# Patient Record
Sex: FEMALE | Race: WHITE | NOT HISPANIC OR LATINO | Employment: OTHER | URBAN - METROPOLITAN AREA
[De-identification: names, ages, dates, MRNs, and addresses within clinical notes are randomized per-mention and may not be internally consistent; named-entity substitution may affect disease eponyms.]

---

## 2017-08-23 ENCOUNTER — ALLSCRIPTS OFFICE VISIT (OUTPATIENT)
Dept: OTHER | Facility: OTHER | Age: 62
End: 2017-08-23

## 2017-08-23 DIAGNOSIS — R63.5 ABNORMAL WEIGHT GAIN: ICD-10-CM

## 2017-08-23 DIAGNOSIS — E78.2 MIXED HYPERLIPIDEMIA: ICD-10-CM

## 2017-09-16 ENCOUNTER — TRANSCRIBE ORDERS (OUTPATIENT)
Dept: ADMINISTRATIVE | Facility: HOSPITAL | Age: 62
End: 2017-09-16

## 2017-09-16 ENCOUNTER — APPOINTMENT (OUTPATIENT)
Dept: LAB | Facility: HOSPITAL | Age: 62
End: 2017-09-16
Attending: INTERNAL MEDICINE
Payer: COMMERCIAL

## 2017-09-16 DIAGNOSIS — R63.5 ABNORMAL WEIGHT GAIN: ICD-10-CM

## 2017-09-16 DIAGNOSIS — E78.2 MIXED HYPERLIPIDEMIA: ICD-10-CM

## 2017-09-16 DIAGNOSIS — E78.2 MIXED HYPERLIPIDEMIA: Primary | ICD-10-CM

## 2017-09-16 LAB
ALBUMIN SERPL BCP-MCNC: 4 G/DL (ref 3.5–5)
ALP SERPL-CCNC: 60 U/L (ref 46–116)
ALT SERPL W P-5'-P-CCNC: 24 U/L (ref 12–78)
ANION GAP SERPL CALCULATED.3IONS-SCNC: 7 MMOL/L (ref 4–13)
AST SERPL W P-5'-P-CCNC: 17 U/L (ref 5–45)
BASOPHILS # BLD AUTO: 0 THOUSANDS/ΜL (ref 0–0.1)
BASOPHILS NFR BLD AUTO: 0 % (ref 0–1)
BILIRUB SERPL-MCNC: 1.1 MG/DL (ref 0.2–1)
BUN SERPL-MCNC: 12 MG/DL (ref 5–25)
CALCIUM SERPL-MCNC: 9.4 MG/DL (ref 8.3–10.1)
CHLORIDE SERPL-SCNC: 103 MMOL/L (ref 100–108)
CHOLEST SERPL-MCNC: 291 MG/DL (ref 50–200)
CO2 SERPL-SCNC: 29 MMOL/L (ref 21–32)
CREAT SERPL-MCNC: 0.64 MG/DL (ref 0.6–1.3)
EOSINOPHIL # BLD AUTO: 0.1 THOUSAND/ΜL (ref 0–0.61)
EOSINOPHIL NFR BLD AUTO: 1 % (ref 0–6)
ERYTHROCYTE [DISTWIDTH] IN BLOOD BY AUTOMATED COUNT: 13 % (ref 11.6–15.1)
GFR SERPL CREATININE-BSD FRML MDRD: 96 ML/MIN/1.73SQ M
GLUCOSE P FAST SERPL-MCNC: 95 MG/DL (ref 65–99)
HCT VFR BLD AUTO: 40.6 % (ref 37–47)
HDLC SERPL-MCNC: 94 MG/DL (ref 40–60)
HGB BLD-MCNC: 13.6 G/DL (ref 12–16)
LDLC SERPL CALC-MCNC: 175 MG/DL (ref 0–100)
LYMPHOCYTES # BLD AUTO: 1.9 THOUSANDS/ΜL (ref 0.6–4.47)
LYMPHOCYTES NFR BLD AUTO: 34 % (ref 14–44)
MCH RBC QN AUTO: 30.5 PG (ref 27–31)
MCHC RBC AUTO-ENTMCNC: 33.7 G/DL (ref 31.4–37.4)
MCV RBC AUTO: 91 FL (ref 82–98)
MONOCYTES # BLD AUTO: 0.4 THOUSAND/ΜL (ref 0.17–1.22)
MONOCYTES NFR BLD AUTO: 7 % (ref 4–12)
NEUTROPHILS # BLD AUTO: 3.2 THOUSANDS/ΜL (ref 1.85–7.62)
NEUTS SEG NFR BLD AUTO: 57 % (ref 43–75)
NRBC BLD AUTO-RTO: 0 /100 WBCS
PLATELET # BLD AUTO: 279 THOUSANDS/UL (ref 130–400)
PMV BLD AUTO: 7.3 FL (ref 8.9–12.7)
POTASSIUM SERPL-SCNC: 3.6 MMOL/L (ref 3.5–5.3)
PROT SERPL-MCNC: 7.4 G/DL (ref 6.4–8.2)
RBC # BLD AUTO: 4.47 MILLION/UL (ref 4.2–5.4)
SODIUM SERPL-SCNC: 139 MMOL/L (ref 136–145)
TRIGL SERPL-MCNC: 110 MG/DL
TSH SERPL DL<=0.05 MIU/L-ACNC: 1.13 UIU/ML (ref 0.36–3.74)
WBC # BLD AUTO: 5.5 THOUSAND/UL (ref 4.8–10.8)

## 2017-09-16 PROCEDURE — 36415 COLL VENOUS BLD VENIPUNCTURE: CPT

## 2017-09-16 PROCEDURE — 85025 COMPLETE CBC W/AUTO DIFF WBC: CPT

## 2017-09-16 PROCEDURE — 80061 LIPID PANEL: CPT

## 2017-09-16 PROCEDURE — 84443 ASSAY THYROID STIM HORMONE: CPT

## 2017-09-16 PROCEDURE — 80053 COMPREHEN METABOLIC PANEL: CPT

## 2017-09-17 ENCOUNTER — GENERIC CONVERSION - ENCOUNTER (OUTPATIENT)
Dept: OTHER | Facility: OTHER | Age: 62
End: 2017-09-17

## 2017-10-09 ENCOUNTER — TRANSCRIBE ORDERS (OUTPATIENT)
Dept: ADMINISTRATIVE | Facility: HOSPITAL | Age: 62
End: 2017-10-09

## 2017-10-09 DIAGNOSIS — Z12.31 VISIT FOR SCREENING MAMMOGRAM: Primary | ICD-10-CM

## 2017-10-09 DIAGNOSIS — Z13.820 OSTEOPOROSIS SCREENING: ICD-10-CM

## 2017-11-30 ENCOUNTER — HOSPITAL ENCOUNTER (OUTPATIENT)
Dept: RADIOLOGY | Facility: HOSPITAL | Age: 62
Discharge: HOME | End: 2017-11-30
Attending: OBSTETRICS & GYNECOLOGY
Payer: COMMERCIAL

## 2017-11-30 DIAGNOSIS — Z12.31 VISIT FOR SCREENING MAMMOGRAM: ICD-10-CM

## 2017-11-30 DIAGNOSIS — Z13.820 OSTEOPOROSIS SCREENING: ICD-10-CM

## 2017-11-30 PROCEDURE — G0202 SCR MAMMO BI INCL CAD: HCPCS

## 2017-11-30 PROCEDURE — 77080 DXA BONE DENSITY AXIAL: CPT

## 2018-01-11 NOTE — RESULT NOTES
Discussion/Summary   Cholesterol level continues to be very high but other bloodwork looks good  Please make an appointment to discuss possible treatments for high cholesterol  - Dr Sj Rollins     Verified Results  (1) CBC/PLT/DIFF [id number] 07:51AM [facility]     Test Name Result Flag Reference   WBC COUNT 5 50 Thousand/uL  4 80-10 80   RBC COUNT 4 47 Million/uL  4 20-5 40   HEMOGLOBIN 13 6 g/dL  12 0-16 0   HEMATOCRIT 40 6 %  37 0-47 0   MCV 91 fL  82-98   MCH 30 5 pg  27 0-31 0   MCHC 33 7 g/dL  31 4-37 4   RDW 13 0 %  11 6-15 1   MPV 7 3 fL L 8 9-12 7   PLATELET COUNT 192 Thousands/uL  130-400   nRBC AUTOMATED 0 /100 WBCs     NEUTROPHILS RELATIVE PERCENT 57 %  43-75   LYMPHOCYTES RELATIVE PERCENT 34 %  14-44   MONOCYTES RELATIVE PERCENT 7 %  4-12   EOSINOPHILS RELATIVE PERCENT 1 %  0-6   BASOPHILS RELATIVE PERCENT 0 %  0-1   NEUTROPHILS ABSOLUTE COUNT 3 20 Thousands/? ??L  1 85-7 62   LYMPHOCYTES ABSOLUTE COUNT 1 90 Thousands/? ??L  0 60-4 47   MONOCYTES ABSOLUTE COUNT 0 40 Thousand/? ??L  0 17-1 22   EOSINOPHILS ABSOLUTE COUNT 0 10 Thousand/? ??L  0 00-0 61   BASOPHILS ABSOLUTE COUNT 0 00 Thousands/? ??L  0 00-0 10   This is a patient instruction: This test is non-fasting  Please drink two glasses of water morning of bloodwork  (1) LIPID PANEL, FASTING [id number] 07:51AM [facility]     Test Name Result Flag Reference   CHOLESTEROL 291 mg/dL H    HDL,DIRECT 94 mg/dL H 40-60   Specimen collection should occur prior to Metamizole administration due to the potential for falsley depressed results  LDL CHOLESTEROL CALCULATED 175 mg/dL H 0-100   This is a patient instruction:  This is a fasting test  Water, black tea or black coffee only after 9:00pm the night before the test  Drink 2 glasses of water the morning of the test         Triglyceride:        Normal <150 mg/dl   Borderline High 150-199 mg/dl   High 200-499 mg/dl   Very High >499 mg/dl      Cholesterol:       Desirable <200 mg/dl Borderline High 200-239 mg/dl    High >239 mg/dl      HDL Cholesterol:       High>59 mg/dL    Low <41 mg/dL      This screening LDL is a calculated result  It does not have the accuracy of the Direct Measured LDL in the monitoring of patients with hyperlipidemia and/or statin therapy  Direct Measure LDL ([id number]) must be ordered separately in these patients  TRIGLYCERIDES 110 mg/dL  <=150   Specimen collection should occur prior to N-Acetylcysteine or Metamizole administration due to the potential for falsely depressed results  (1) COMPREHENSIVE METABOLIC PANEL [id number] 53:88LM [facility] On license of [facility]     Test Name Result Flag Reference   SODIUM 139 mmol/L  136-145   POTASSIUM 3 6 mmol/L  3 5-5 3   CHLORIDE 103 mmol/L  100-108   CARBON DIOXIDE 29 mmol/L  21-32   ANION GAP (CALC) 7 mmol/L  4-13   BLOOD UREA NITROGEN 12 mg/dL  5-25   CREATININE 0 64 mg/dL  0 60-1 30   Standardized to IDMS reference method   CALCIUM 9 4 mg/dL  8 3-10 1   BILI, TOTAL 1 10 mg/dL H 0 20-1 00   ALK PHOSPHATAS 60 U/L     ALT (SGPT) 24 U/L  12-78   Specimen collection should occur prior to Sulfasalazine administration due to the potential for falsely depressed results  AST(SGOT) 17 U/L  5-45   Specimen collection should occur prior to Sulfasalazine administration due to the potential for falsely depressed results  ALBUMIN 4 0 g/dL  3 5-5 0   TOTAL PROTEIN 7 4 g/dL  6 4-8 2   eGFR 96 ml/min/1 73sq m     [facility] Disease Education Program recommendations are as follows:  GFR calculation is accurate only with a steady state creatinine  Chronic Kidney disease less than 60 ml/min/1 73 sq  meters  Kidney failure less than 15 ml/min/1 73 sq  meters  GLUCOSE FASTING 95 mg/dL  65-99   Specimen collection should occur prior to Sulfasalazine administration due to the potential for falsely depressed results  Specimen collection should occur prior to Sulfapyridine administration due to the potential for falsely elevated results  (1) TSH WITH FT4 REFLEX [id number] 07:51AM [facility]     Test Name Result Flag Reference   TSH 1 128 uIU/mL  0 358-3 740   Patients undergoing fluorescein dye angiography may retain small amounts of fluorescein in the body for 48-72 hours post procedure  Samples containing fluorescein can produce falsely depressed TSH values  If the patient had this procedure,a specimen should be resubmitted post fluorescein clearance            The recommended reference ranges for TSH during pregnancy are as follows:  First trimester 0 1 to 2 5 uIU/mL  Second trimester  0 2 to 3 0 uIU/mL  Third trimester 0 3 to 3 0 uIU/m

## 2018-01-12 NOTE — PROGRESS NOTES
Assessment    1  Encounter for preventive health examination (V70 0) (Z00 00)   2  Adult BMI 27 0-27 9 kg/sq m (V85 23) (Z68 27)   3  Weight gain (783 1) (R63 5)    Discussion/Summary  health maintenance visit Currently, she eats a healthy diet and has an inadequate exercise regimen  the risks and benefits of cervical cancer screening were discussed cervical cancer screening is current cervical cancer screening is managed by gyn Breast cancer screening: the risks and benefits of breast cancer screening were discussed, monthly self breast exam was advised and mammogram is current  Colorectal cancer screening: the patient declines colorectal cancer screening  Osteoporosis screening: bone mineral density testing is current  Screening lab work includes hemoglobin, glucose, lipid profile and thyroid function testing  The patient declines immunizations  Advice and education were given regarding nutrition, aerobic exercise, weight loss, calcium supplements, vitamin D supplements, alcohol use, sunscreen use and seat belt use  Chief Complaint  cpe      History of Present Illness  HM, Adult Female: The patient is being seen for a health maintenance evaluation  The last health maintenance visit was 3 year(s) ago  General Health: The patient's health since the last visit is described as good  She has regular dental visits  She denies vision problems  She denies hearing loss  Immunizations status: not up to date  Lifestyle:  She consumes a diverse and healthy diet  She has weight concerns  She does not exercise regularly  She does not use tobacco  She denies alcohol use  She denies drug use  Reproductive health: the patient is postmenopausal    Screening: cancer screening reviewed and updated  metabolic screening reviewed and updated  risk screening reviewed and updated  HPI: Here for cpe  Having some isssues with weight gain  She cannot think of a reason, but does have a family history of hypothyroidism  Review of Systems    Constitutional: recent weight gain  Eyes: No complaints of eye pain, no red eyes, no eyesight problems, no discharge, no dry eyes, no itching of eyes  ENT: no complaints of earache, no loss of hearing, no nose bleeds, no nasal discharge, no sore throat, no hoarseness  Cardiovascular: No complaints of slow heart rate, no fast heart rate, no chest pain, no palpitations, no leg claudication, no lower extremity edema  Respiratory: No complaints of shortness of breath, no wheezing, no cough, no SOB on exertion, no orthopnea, no PND  Gastrointestinal: No complaints of abdominal pain, no constipation, no nausea or vomiting, no diarrhea, no bloody stools  Genitourinary: No complaints of dysuria, no incontinence, no pelvic pain, no dysmenorrhea, no vaginal discharge or bleeding  Musculoskeletal: No complaints of arthralgias, no myalgias, no joint swelling or stiffness, no limb pain or swelling  Integumentary: No complaints of skin rash or lesions, no itching, no skin wounds, no breast pain or lump  Neurological: No complaints of headache, no confusion, no convulsions, no numbness, no dizziness or fainting, no tingling, no limb weakness, no difficulty walking  Psychiatric: Not suicidal, no sleep disturbance, no anxiety or depression, no change in personality, no emotional problems  Endocrine: No complaints of proptosis, no hot flashes, no muscle weakness, no deepening of the voice, no feelings of weakness  Hematologic/Lymphatic: No complaints of swollen glands, no swollen glands in the neck, does not bleed easily, does not bruise easily  Active Problems    1  Anxiety disorder (300 00) (F41 9)   2  Colon cancer screening (V76 51) (Z12 11)   3  Dysfunction of left eustachian tube (381 81) (W49 82)   4  Encounter for screening colonoscopy (V76 51) (Z12 11)   5  Encounter for screening for osteoporosis (V82 81) (Z13 820)   6   Encounter for screening mammogram for breast cancer (V76 12) (Z12 31)   7  Exophthalmos (376 30) (H05 20)   8  Herpangina (074 0) (B08 5)   9  Lumbago (724 2) (M54 5)   10  Mixed hyperlipidemia (272 2) (E78 2)   11  Otalgia, unspecified laterality (388 70) (H92 09)    Past Medical History    · History of Acute maxillary sinusitis, recurrence not specified (461 0) (J01 00)   · Acute sinusitis (461 9) (J01 90)    Family History  Mother    · No pertinent family history  Father    · No pertinent family history    Social History    · Never A Smoker    Current Meds   1  ALPRAZolam 0 25 MG Oral Tablet; 1 tid prn; Therapy: [id number] to (Last Rx:31Oct2016)  Requested for: 31Oct2016 Ordered   2  Citracal/Vitamin D TABS; TAKE 2 TABLETS ONCE DAILY Recorded   3  Daily Vitamin Oral Tablet; Take 1 tablet daily Recorded   4  Glucosamine Chondr 1500 Complx Oral Capsule; TAKE 1 CAPSULE Daily Recorded    Allergies    1  No Known Drug Allergies    Vitals   Recorded: [id number] 02:39PM   Temperature 96 9 F   Heart Rate 80   Respiration 16   Systolic 562   Diastolic 72   Height 5 ft 3 in   Weight 158 lb    BMI Calculated 27 99   BSA Calculated 1 75     Physical Exam    Constitutional   General appearance: No acute distress, well appearing and well nourished  Eyes   Conjunctiva and lids: No swelling, erythema or discharge  Pupils and irises: Equal, round, reactive to light  Ears, Nose, Mouth, and Throat   External inspection of ears and nose: Normal     Otoscopic examination: Tympanic membranes translucent with normal light reflex  Canals patent without erythema  Hearing: Normal     Nasal mucosa, septum, and turbinates: Normal without edema or erythema  Lips, teeth, and gums: Normal, good dentition  Oropharynx: Normal with no erythema, edema, exudate or lesions  Neck   Neck: Supple, symmetric, trachea midline, no masses  Thyroid: Normal, no thyromegaly  Pulmonary   Respiratory effort: No increased work of breathing or signs of respiratory distress      Percussion of chest: Normal     Palpation of chest: Normal     Auscultation of lungs: Clear to auscultation  Cardiovascular   Palpation of heart: Normal PMI, no thrills  Auscultation of heart: Normal rate and rhythm, normal S1 and S2, no murmurs  Carotid pulses: 2+ bilaterally  Abdominal aorta: Normal     Femoral pulses: 2+ bilaterally  Pedal pulses: 2+ bilaterally  Examination of extremities for edema and/or varicosities: Normal     Abdomen   Abdomen: Non-tender, no masses  Liver and spleen: No hepatomegaly or splenomegaly  Examination for hernias: No hernia appreciated  Lymphatic   Palpation of lymph nodes in neck: No lymphadenopathy  Palpation of lymph nodes in axillae: No lymphadenopathy  Palpation of lymph nodes in groin: No lymphadenopathy  Palpation of lymph nodes in other areas: No lymphadenopathy  Musculoskeletal   Gait and station: Normal     Digits and nails: Normal without clubbing or cyanosis  Joints, bones, and muscles: Normal     Range of motion: Normal     Stability: Normal     Muscle strength/tone: Normal     Skin   Skin and subcutaneous tissue: Normal without rashes or lesions  Palpation of skin and subcutaneous tissue: Normal turgor  Neurologic   Cranial nerves: Cranial nerves II-XII intact  Reflexes: 2+ and symmetric  Sensation: No sensory loss  Psychiatric   Judgment and insight: Normal     Orientation to person, place, and time: Normal     Recent and remote memory: Intact  Mood and affect: Normal        Procedure    Procedure: Indication: routine screening     Results: 20/20 in both eyes with corrective device, 20/20 in the right eye with corrective device, 20/20 in the left eye with corrective device   Color vision was and the results were normal       Signatures   Electronically signed by : SID Hartley ; Aug 23 2017  3:15PM EST                       (Author)

## 2018-01-13 VITALS
RESPIRATION RATE: 16 BRPM | SYSTOLIC BLOOD PRESSURE: 124 MMHG | BODY MASS INDEX: 28 KG/M2 | TEMPERATURE: 96.9 F | DIASTOLIC BLOOD PRESSURE: 72 MMHG | HEART RATE: 80 BPM | HEIGHT: 63 IN | WEIGHT: 158 LBS

## 2018-10-10 ENCOUNTER — TRANSCRIBE ORDERS (OUTPATIENT)
Dept: ADMINISTRATIVE | Facility: HOSPITAL | Age: 63
End: 2018-10-10

## 2018-10-10 DIAGNOSIS — Z12.39 SCREENING BREAST EXAMINATION: Primary | ICD-10-CM

## 2018-12-04 ENCOUNTER — HOSPITAL ENCOUNTER (OUTPATIENT)
Dept: RADIOLOGY | Facility: HOSPITAL | Age: 63
Discharge: HOME | End: 2018-12-04
Attending: OBSTETRICS & GYNECOLOGY
Payer: COMMERCIAL

## 2018-12-04 VITALS — BODY MASS INDEX: 26.22 KG/M2 | WEIGHT: 148 LBS | HEIGHT: 63 IN

## 2018-12-04 DIAGNOSIS — Z12.39 SCREENING BREAST EXAMINATION: ICD-10-CM

## 2018-12-04 PROCEDURE — 77067 SCR MAMMO BI INCL CAD: CPT

## 2018-12-28 ENCOUNTER — TELEPHONE (OUTPATIENT)
Dept: FAMILY MEDICINE CLINIC | Facility: CLINIC | Age: 63
End: 2018-12-28

## 2018-12-28 NOTE — TELEPHONE ENCOUNTER
Kirill javier has extreme anxiety and got a jury duty letter for March  She does not drive and jury duty would be in [facility]  Is Dr Meghna Irvin willing to write a note excusing her?     Please let patient know     Thank you

## 2018-12-31 NOTE — TELEPHONE ENCOUNTER
I have to certify that she has a severe medical condition to get her out of jury duty and I unfortunately can't do that

## 2019-06-03 ENCOUNTER — OFFICE VISIT (OUTPATIENT)
Dept: FAMILY MEDICINE CLINIC | Facility: CLINIC | Age: 64
End: 2019-06-03
Payer: COMMERCIAL

## 2019-06-03 VITALS
WEIGHT: 149 LBS | SYSTOLIC BLOOD PRESSURE: 110 MMHG | HEART RATE: 66 BPM | RESPIRATION RATE: 18 BRPM | HEIGHT: 63 IN | DIASTOLIC BLOOD PRESSURE: 68 MMHG | BODY MASS INDEX: 26.4 KG/M2 | TEMPERATURE: 97.8 F

## 2019-06-03 DIAGNOSIS — R09.81 SINUS CONGESTION: Primary | ICD-10-CM

## 2019-06-03 PROCEDURE — 99213 OFFICE O/P EST LOW 20 MIN: CPT | Performed by: NURSE PRACTITIONER

## 2019-06-03 RX ORDER — METHYLPREDNISOLONE 4 MG/1
TABLET ORAL
Qty: 21 TABLET | Refills: 0 | Status: SHIPPED | OUTPATIENT
Start: 2019-06-03 | End: 2019-07-29

## 2019-07-29 ENCOUNTER — OFFICE VISIT (OUTPATIENT)
Dept: FAMILY MEDICINE CLINIC | Facility: CLINIC | Age: 64
End: 2019-07-29
Payer: COMMERCIAL

## 2019-07-29 VITALS
BODY MASS INDEX: 26.79 KG/M2 | DIASTOLIC BLOOD PRESSURE: 78 MMHG | RESPIRATION RATE: 18 BRPM | TEMPERATURE: 98.8 F | HEART RATE: 68 BPM | HEIGHT: 63 IN | SYSTOLIC BLOOD PRESSURE: 124 MMHG | WEIGHT: 151.2 LBS

## 2019-07-29 DIAGNOSIS — B02.9 HERPES ZOSTER WITHOUT COMPLICATION: Primary | ICD-10-CM

## 2019-07-29 PROCEDURE — 99213 OFFICE O/P EST LOW 20 MIN: CPT | Performed by: NURSE PRACTITIONER

## 2019-07-29 PROCEDURE — 3008F BODY MASS INDEX DOCD: CPT | Performed by: NURSE PRACTITIONER

## 2019-07-29 RX ORDER — VALACYCLOVIR HYDROCHLORIDE 1 G/1
1000 TABLET, FILM COATED ORAL 3 TIMES DAILY
Qty: 21 TABLET | Refills: 0 | Status: SHIPPED | OUTPATIENT
Start: 2019-07-29 | End: 2019-09-16 | Stop reason: ALTCHOICE

## 2019-07-29 NOTE — PROGRESS NOTES
Assessment/Plan:    Take Valacyclovir as directed  Reviewed supportive care  Advised rash is contagious until lesions crust over  RTO prn  Declined CRC screening    Problem List Items Addressed This Visit     None      Visit Diagnoses     Herpes zoster without complication    -  Primary    Relevant Medications    valACYclovir (VALTREX) 1,000 mg tablet                Patient Instructions     Capsaicin cream over the counter   May continue with Tylenol as needed    Shingles   WHAT YOU NEED TO KNOW:   What is shingles? Shingles is a painful rash  Shingles is caused by the same virus that causes chickenpox (varicella-zoster)  After you get chickenpox, the virus stays in your body for several years without causing any symptoms  Shingles occurs when the virus becomes active again  Once active, the virus will travel along a nerve to your skin and cause a rash  What are the signs and symptoms of shingles? Shingles often starts with pain in the back, chest, neck, or face  A rash then develops in the same area  The rash is usually found on only one side of the body  The rash may feel itchy or painful  It starts as red dots that become blisters filled with fluid  The blisters usually grow bigger, become filled with pus, and then crust over after a few days  You may also have any of the following:  · Fatigue and muscle weakness    · Pain when your skin is lightly touched    · Headache    · Fever    · Eye pain when exposed to light  What increases my risk for shingles? · Age older than 48    · Exposure to the virus while your mother was pregnant with you    · A medical condition such as cancer, AIDS, or Hodgkin disease     · Treatment for cancer that decreases your immune system    · Stressful life events that weaken your immune system    · An organ or stem cell transplant  How is shingles diagnosed? Your healthcare provider will ask about your symptoms  Tell him if you have had chickenpox   Tell him if you have recently been around anyone who has chickenpox or shingles  The appearance of your rash will usually be enough for your healthcare provider to know you have shingles  He may also send skin scrapings or fluid from your blisters for tests  How is shingles treated? · Antiviral medicine  helps decrease symptoms and healing time  It may also decrease your risk for nerve pain  You will need to start taking this medicine within 3 days of the start of symptoms to prevent nerve pain  · Pain medicine  may be prescribed or suggested by your healthcare provider  You may need NSAIDs, acetaminophen, or opioid medicine depending on how much pain you are in  · Topical anesthetics  are used to numb the skin and decrease pain  They can be a cream, gel, spray, or patch  · Anticonvulsants  decrease nerve pain and may help you sleep at night  · Antidepressants  may be used to decrease nerve pain  · Epidural medicine  is put into your spine to block pain  This medicine treats severe pain that does not get better with other pain medicines  Epidural medicine includes numbing medicine and steroids  Can I infect others? The virus can be passed to a person who has never had chickenpox  This person may get chickenpox, but not shingles  You may pass the virus to others as long as you have a rash  The virus is spread by direct contact with the fluid from the blisters  Usually, you cannot spread the virus once the blisters dry up  What are the risks of shingles? If left untreated, shingles may cause eye problems, such as a drooping eyelid or blindness  It may lead to a brain infection or stroke  Shingles can also cause nerve damage and lead to twitching, dizziness, or loss of taste and hearing  The blisters may leave scars or changes in skin color  Shingles may cause pain even after the rash is gone  It may also lead to trouble moving parts of your body  How can I care for myself? Keep your rash clean and dry  Cover your rash with a bandage or clothing  Do not use bandages with adhesive  These may irritate your skin and make your rash last longer  What can I do to help prevent shingles or a shingles outbreak? A vaccine may be given to help prevent shingles  Ask for more information about this vaccine  Where can I find more information about shingles? · Centers for Disease Control and Prevention  [address] , [address]  Phone: 1- 323 - 0724702  Phone: 3- 077 - 5011407  Web Address: [employer] com   When should I seek immediate care? · You have painful, red, warm skin around the blisters, or the blisters drain pus  · Your neck is stiff or you have trouble moving it  · You have trouble moving your arms, legs, or face  · You have a seizure  · You have weakness in an arm or leg  · You become confused, or have difficulty speaking  · You have dizziness, a severe headache, or hearing or vision loss  When should I contact my healthcare provider? · You feel weak or have a headache  · You have a cough, chills, or a fever  · You have abdominal pain or nausea, or you are vomiting  · Your rash becomes more itchy or painful  · Your rash spreads to other parts of your body  · Your pain worsens and does not go away even after you take medicine  · You have questions or concerns about your condition or care  CARE AGREEMENT:   You have the right to help plan your care  Learn about your health condition and how it may be treated  Discuss treatment options with your caregivers to decide what care you want to receive  You always have the right to refuse treatment  The above information is an  only  It is not intended as medical advice for individual conditions or treatments  Talk to your doctor, nurse or pharmacist before following any medical regimen to see if it is safe and effective for you    © 2017 [employer] Information is for End User's use only and may not be sold, redistributed or otherwise used for commercial purposes  All illustrations and images included in CareNotes® are the copyrighted property of A D A M , Inc  or Mack Pinedo if symptoms worsen or fail to improve  Subjective:      Patient ID: Skyler Ferro is a 59 y o  female  Chief Complaint   Patient presents with    Rash     painful on abdomen  [facility]       Here today with complaints of a painful rash on her abdomen that started about 4 days ago  States it initially felt itchy, but she didn't think much of it, as she had family visiting  The pain has been keeping her up at night  She has been taking Tylenol, which dulls the pain  Reports having had shingles on the other side of her abdomen while in her 25s  The following portions of the patient's history were reviewed and updated as appropriate: allergies, current medications, past family history, past medical history, past social history, past surgical history and problem list     Review of Systems   Constitutional: Negative  Skin: Positive for rash  Current Outpatient Medications   Medication Sig Dispense Refill    valACYclovir (VALTREX) 1,000 mg tablet Take 1 tablet (1,000 mg total) by mouth 3 (three) times a day for 7 days 21 tablet 0     No current facility-administered medications for this visit  Objective:    /78   Pulse 68   Temp 98 8 °F (37 1 °C)   Resp 18   Ht 5' 3" (1 6 m)   Wt 68 6 kg (151 lb 3 2 oz)   BMI 26 78 kg/m²        Physical Exam   Constitutional: She appears well-developed and well-nourished  Cardiovascular: Normal rate, regular rhythm and normal heart sounds  No murmur heard  Pulmonary/Chest: Effort normal and breath sounds normal    Neurological: She is alert  Skin: Skin is warm and dry  Psychiatric: She has a normal mood and affect  Nursing note and vitals reviewed               Soham Garrison

## 2019-07-29 NOTE — PATIENT INSTRUCTIONS
Capsaicin cream over the counter   May continue with Tylenol as needed    Shingles   WHAT YOU NEED TO KNOW:   What is shingles? Shingles is a painful rash  Shingles is caused by the same virus that causes chickenpox (varicella-zoster)  After you get chickenpox, the virus stays in your body for several years without causing any symptoms  Shingles occurs when the virus becomes active again  Once active, the virus will travel along a nerve to your skin and cause a rash  What are the signs and symptoms of shingles? Shingles often starts with pain in the back, chest, neck, or face  A rash then develops in the same area  The rash is usually found on only one side of the body  The rash may feel itchy or painful  It starts as red dots that become blisters filled with fluid  The blisters usually grow bigger, become filled with pus, and then crust over after a few days  You may also have any of the following:  · Fatigue and muscle weakness    · Pain when your skin is lightly touched    · Headache    · Fever    · Eye pain when exposed to light  What increases my risk for shingles? · Age older than 48    · Exposure to the virus while your mother was pregnant with you    · A medical condition such as cancer, AIDS, or Hodgkin disease     · Treatment for cancer that decreases your immune system    · Stressful life events that weaken your immune system    · An organ or stem cell transplant  How is shingles diagnosed? Your healthcare provider will ask about your symptoms  Tell him if you have had chickenpox  Tell him if you have recently been around anyone who has chickenpox or shingles  The appearance of your rash will usually be enough for your healthcare provider to know you have shingles  He may also send skin scrapings or fluid from your blisters for tests  How is shingles treated? · Antiviral medicine  helps decrease symptoms and healing time  It may also decrease your risk for nerve pain   You will need to start taking this medicine within 3 days of the start of symptoms to prevent nerve pain  · Pain medicine  may be prescribed or suggested by your healthcare provider  You may need NSAIDs, acetaminophen, or opioid medicine depending on how much pain you are in  · Topical anesthetics  are used to numb the skin and decrease pain  They can be a cream, gel, spray, or patch  · Anticonvulsants  decrease nerve pain and may help you sleep at night  · Antidepressants  may be used to decrease nerve pain  · Epidural medicine  is put into your spine to block pain  This medicine treats severe pain that does not get better with other pain medicines  Epidural medicine includes numbing medicine and steroids  Can I infect others? The virus can be passed to a person who has never had chickenpox  This person may get chickenpox, but not shingles  You may pass the virus to others as long as you have a rash  The virus is spread by direct contact with the fluid from the blisters  Usually, you cannot spread the virus once the blisters dry up  What are the risks of shingles? If left untreated, shingles may cause eye problems, such as a drooping eyelid or blindness  It may lead to a brain infection or stroke  Shingles can also cause nerve damage and lead to twitching, dizziness, or loss of taste and hearing  The blisters may leave scars or changes in skin color  Shingles may cause pain even after the rash is gone  It may also lead to trouble moving parts of your body  How can I care for myself? Keep your rash clean and dry  Cover your rash with a bandage or clothing  Do not use bandages with adhesive  These may irritate your skin and make your rash last longer  What can I do to help prevent shingles or a shingles outbreak? A vaccine may be given to help prevent shingles  Ask for more information about this vaccine  Where can I find more information about shingles?    · Centers for Disease Control and Prevention  [address] [place] [address] [address] [place] , [address]  Phone: 4- 540 - [phone]  Phone: 6- 331 - [phone]  Web Address: [employer] com   When should I seek immediate care? · You have painful, red, warm skin around the blisters, or the blisters drain pus  · Your neck is stiff or you have trouble moving it  · You have trouble moving your arms, legs, or face  · You have a seizure  · You have weakness in an arm or leg  · You become confused, or have difficulty speaking  · You have dizziness, a severe headache, or hearing or vision loss  When should I contact my healthcare provider? · You feel weak or have a headache  · You have a cough, chills, or a fever  · You have abdominal pain or nausea, or you are vomiting  · Your rash becomes more itchy or painful  · Your rash spreads to other parts of your body  · Your pain worsens and does not go away even after you take medicine  · You have questions or concerns about your condition or care  CARE AGREEMENT:   You have the right to help plan your care  Learn about your health condition and how it may be treated  Discuss treatment options with your caregivers to decide what care you want to receive  You always have the right to refuse treatment  The above information is an  only  It is not intended as medical advice for individual conditions or treatments  Talk to your doctor, nurse or pharmacist before following any medical regimen to see if it is safe and effective for you  © 2017 [employer] Information is for End User's use only and may not be sold, redistributed or otherwise used for commercial purposes  All illustrations and images included in CareNotes® are the copyrighted property of A D A M , Inc  or [employer] Alva

## 2019-08-06 ENCOUNTER — HOSPITAL ENCOUNTER (EMERGENCY)
Facility: HOSPITAL | Age: 64
Discharge: HOME | End: 2019-08-06
Attending: EMERGENCY MEDICINE
Payer: COMMERCIAL

## 2019-08-06 ENCOUNTER — TELEPHONE (OUTPATIENT)
Dept: FAMILY MEDICINE CLINIC | Facility: CLINIC | Age: 64
End: 2019-08-06

## 2019-08-06 VITALS
BODY MASS INDEX: 26.57 KG/M2 | DIASTOLIC BLOOD PRESSURE: 94 MMHG | WEIGHT: 150 LBS | TEMPERATURE: 99.2 F | SYSTOLIC BLOOD PRESSURE: 181 MMHG | RESPIRATION RATE: 18 BRPM | HEART RATE: 91 BPM | OXYGEN SATURATION: 99 %

## 2019-08-06 DIAGNOSIS — B02.29 POST HERPETIC NEURALGIA: Primary | ICD-10-CM

## 2019-08-06 DIAGNOSIS — B02.9 HERPES ZOSTER WITHOUT COMPLICATION: Primary | ICD-10-CM

## 2019-08-06 PROCEDURE — 99282 EMERGENCY DEPT VISIT SF MDM: CPT

## 2019-08-06 PROCEDURE — 96372 THER/PROPH/DIAG INJ SC/IM: CPT

## 2019-08-06 RX ORDER — GABAPENTIN 300 MG/1
CAPSULE ORAL
Qty: 60 CAPSULE | Refills: 0 | Status: SHIPPED | OUTPATIENT
Start: 2019-08-06 | End: 2019-08-31 | Stop reason: SDUPTHER

## 2019-08-06 RX ORDER — GABAPENTIN 300 MG/1
300 CAPSULE ORAL 2 TIMES DAILY
Qty: 17 CAPSULE | Refills: 0 | Status: SHIPPED | OUTPATIENT
Start: 2019-08-06 | End: 2019-09-16 | Stop reason: ALTCHOICE

## 2019-08-06 RX ORDER — GABAPENTIN 300 MG/1
300 CAPSULE ORAL ONCE
Status: COMPLETED | OUTPATIENT
Start: 2019-08-06 | End: 2019-08-06

## 2019-08-06 RX ORDER — KETOROLAC TROMETHAMINE 30 MG/ML
15 INJECTION, SOLUTION INTRAMUSCULAR; INTRAVENOUS ONCE
Status: DISCONTINUED | OUTPATIENT
Start: 2019-08-06 | End: 2019-08-06

## 2019-08-06 RX ORDER — KETOROLAC TROMETHAMINE 30 MG/ML
15 INJECTION, SOLUTION INTRAMUSCULAR; INTRAVENOUS ONCE
Status: COMPLETED | OUTPATIENT
Start: 2019-08-06 | End: 2019-08-06

## 2019-08-06 RX ADMIN — KETOROLAC TROMETHAMINE 15 MG: 30 INJECTION, SOLUTION INTRAMUSCULAR at 16:50

## 2019-08-06 RX ADMIN — GABAPENTIN 300 MG: 300 CAPSULE ORAL at 16:49

## 2019-08-06 NOTE — TELEPHONE ENCOUNTER
Patient seen last week and diagnosed with shingles  She is finished with the Anti Viral 7 day medication and stated "the worst of the pain is gone" but still very uncomfortable  Is there anything else she can be prescribed? Another round of antiviral medication?     Please call the patient and advise

## 2019-08-06 NOTE — ED PROVIDER NOTES
History  Chief Complaint   Patient presents with    Rash     States she was dx with shingles and completed Valtrex  Rash right side of trunk  States she was not given pain med and she cannot sleep  Tearful  States she has no energy and is very weak     60 y/o female presenting for evaluation of a rash to the right portion of her chest and side over the past 2 weeks  Patient states that she just got over treatment for shingles finishing a 7 day course of Valtrex  Relays that she called her doctor this morning requesting something else for pain  States that she has a hard time tolerating naproxen or ibuprofen due to GI side effects  Has been taking Tylenol with minimal relief  States that she has been feeling generally fatigued and has had poor sleep  States that she has insomnia on a regular basis however due to the burning radiating pain this has worsened  Overall the rash has calmed down and pain has decreased however is frustrated as the pain continues  Patient had burning like pain before the rash began for about 3 days, had symptoms for 3 days prior to starting valtrex  Denies fevers, nausea vomiting, chest pain, abdominal pain, shortness of breath, headache, fevers  Prior to Admission Medications   Prescriptions Last Dose Informant Patient Reported? Taking?   gabapentin (NEURONTIN) 300 mg capsule   No No   Si cap PO daily on day 1, then 1 cap PO BID   valACYclovir (VALTREX) 1,000 mg tablet  Self No No   Sig: Take 1 tablet (1,000 mg total) by mouth 3 (three) times a day for 7 days   Patient taking differently: Take 1,000 mg by mouth 3 (three) times a day Patient completed      Facility-Administered Medications: None       History reviewed  No pertinent past medical history      Past Surgical History:   Procedure Laterality Date    HYSTERECTOMY      TONSILLECTOMY         Family History   Problem Relation Age of Onset    Breast cancer Maternal Grandmother      I have reviewed and agree with the history as documented  Social History     Tobacco Use    Smoking status: Never Smoker    Smokeless tobacco: Never Used   Substance Use Topics    Alcohol use: Not Currently    Drug use: Never        Review of Systems   Constitutional: Positive for fatigue  Negative for activity change, appetite change, chills, diaphoresis, fever and unexpected weight change  HENT: Negative  Eyes: Negative  Respiratory: Negative  Cardiovascular: Negative  Gastrointestinal: Negative  Genitourinary: Negative  Musculoskeletal: Negative  Skin: Positive for rash  Negative for color change, pallor and wound  Neurological: Positive for weakness  Negative for dizziness, tremors, seizures, syncope, facial asymmetry, speech difficulty, light-headedness, numbness and headaches  All other systems reviewed and are negative  Physical Exam  Physical Exam   Constitutional: She is oriented to person, place, and time  She appears well-developed and well-nourished  HENT:   Head: Normocephalic and atraumatic  Nose: Nose normal    Cardiovascular: Normal rate, regular rhythm, normal heart sounds and intact distal pulses  Exam reveals no gallop and no friction rub  No murmur heard  Pulmonary/Chest: Effort normal and breath sounds normal  No stridor  No respiratory distress  She has no wheezes  She has no rales  She exhibits no tenderness  spo2 is 99% indicating adequate oxygenation    Abdominal: Soft  Bowel sounds are normal  She exhibits no distension and no mass  There is no tenderness  There is no rebound and no guarding  No hernia  Neurological: She is alert and oriented to person, place, and time  Skin: Skin is warm and dry  Capillary refill takes less than 2 seconds  Rash noted  Nursing note and vitals reviewed        Vital Signs  ED Triage Vitals [08/06/19 1555]   Temperature Pulse Respirations Blood Pressure SpO2   99 2 °F (37 3 °C) 91 18 (!) 181/94 99 %      Temp Source Heart Rate Source Patient Position - Orthostatic VS BP Location FiO2 (%)   Oral Monitor Lying Right arm --      Pain Score       9           Vitals:    08/06/19 1555   BP: (!) 181/94   Pulse: 91   Patient Position - Orthostatic VS: Lying         Visual Acuity      ED Medications  Medications   gabapentin (NEURONTIN) capsule 300 mg (300 mg Oral Given 8/6/19 1649)   ketorolac (TORADOL) injection 15 mg (15 mg Intramuscular Given 8/6/19 1650)       Diagnostic Studies  Results Reviewed     None                 No orders to display              Procedures  Procedures       ED Course                               MDM  Number of Diagnoses or Management Options  Post herpetic neuralgia:   Diagnosis management comments: Patient persists with small area herpetic like rash, will treat for post herpetic neuralgia  Offered labs and IV fluids however agreeable to IM toradol and gabapentin, will need further refills from PCP however will get patient started on this  She is not on any narcotics  Patient is informed to return to the emergency department for worsening of symptoms and was given proper education regarding their diagnosis and symptoms  Otherwise the patient is informed to follow up with their primary care doctor for re-evaluation  The patient verbalizes understanding and agrees with above assessment and plan  All questions were answered  Please Note: Fluency Direct voice recognition software may have been used in the creation of this document  Wrong words or sound a like substitutions may have occurred due to the inherent limitations of the voice software             Amount and/or Complexity of Data Reviewed  Review and summarize past medical records: yes  Independent visualization of images, tracings, or specimens: yes        Disposition  Final diagnoses:   Post herpetic neuralgia     Time reflects when diagnosis was documented in both MDM as applicable and the Disposition within this note     Time User Action Codes Description Comment    8/6/2019  4:56 PM Fernando Jackson Add [B02 29] Post herpetic neuralgia       ED Disposition     ED Disposition Condition Date/Time Comment    Discharge Stable Tue Aug 6, 2019  4:56 PM Sonia Valles discharge to home/self care  Follow-up Information     Follow up With Specialties Details Why Contact Info Additional [address] Emergency Department Emergency Medicine Go to  If symptoms worsen [address] [facility]  [phone] [facility], [place], [place], [address] [facility], [place] Internal Medicine, Family Medicine Schedule an appointment as soon as possible for a visit in 1 week  [address]  [address] [facility]  [phone]             Patient's Medications   Discharge Prescriptions    GABAPENTIN (NEURONTIN) 300 MG CAPSULE    1 cap PO daily on day 1, then 1 cap PO BID       Start Date: 8/6/2019  End Date: --       Order Dose: --       Quantity: 60 capsule    Refills: 0    GABAPENTIN (NEURONTIN) 300 MG CAPSULE    Take 1 capsule (300 mg total) by mouth 2 (two) times a day for 6 days For post-herpetic neuralgia:  take 2 tablets on day 2, Then take 3 tablets on day 3 and every day after that as instructed by your doctor  Start Date: 8/6/2019  End Date: 8/12/2019       Order Dose: 300 mg       Quantity: 17 capsule    Refills: 0     No discharge procedures on file      ED Provider  Electronically Signed by           Rohit Addison PA-C  08/06/19 9437

## 2019-08-06 NOTE — TELEPHONE ENCOUNTER
Patient calling again   She is in a lot of pain due to her shingles and wants to know if sil can prescribe   Gabapentin     Pharm[facility]

## 2019-08-06 NOTE — TELEPHONE ENCOUNTER
I left a message for pt to call back  I did send over Gabapentin for her with instructions  If she feels that her pain is not improved early next week, she can call the office for further dosing change    Mulugeta Nguyễn

## 2019-08-13 ENCOUNTER — TELEPHONE (OUTPATIENT)
Dept: FAMILY MEDICINE CLINIC | Facility: CLINIC | Age: 64
End: 2019-08-13

## 2019-08-13 DIAGNOSIS — B02.29 POST HERPETIC NEURALGIA: ICD-10-CM

## 2019-08-13 DIAGNOSIS — B02.9 HERPES ZOSTER WITHOUT COMPLICATION: Primary | ICD-10-CM

## 2019-08-13 RX ORDER — DULOXETIN HYDROCHLORIDE 30 MG/1
30 CAPSULE, DELAYED RELEASE ORAL DAILY
Qty: 30 CAPSULE | Refills: 0 | Status: SHIPPED | OUTPATIENT
Start: 2019-08-13 | End: 2019-09-03 | Stop reason: SDUPTHER

## 2019-08-13 NOTE — TELEPHONE ENCOUNTER
Gabapentin is causing Amy constipation  Is there anything we can suggest or prescribe patient?      Ruben Ruth    Call patient [phone]  Thank you

## 2019-08-31 DIAGNOSIS — B02.9 HERPES ZOSTER WITHOUT COMPLICATION: ICD-10-CM

## 2019-09-03 DIAGNOSIS — B02.29 POST HERPETIC NEURALGIA: ICD-10-CM

## 2019-09-03 DIAGNOSIS — B02.9 HERPES ZOSTER WITHOUT COMPLICATION: ICD-10-CM

## 2019-09-03 RX ORDER — GABAPENTIN 300 MG/1
300 CAPSULE ORAL 2 TIMES DAILY
Qty: 60 CAPSULE | Refills: 0 | Status: SHIPPED | OUTPATIENT
Start: 2019-09-03 | End: 2019-09-16 | Stop reason: ALTCHOICE

## 2019-09-03 RX ORDER — DULOXETIN HYDROCHLORIDE 30 MG/1
30 CAPSULE, DELAYED RELEASE ORAL DAILY
Qty: 30 CAPSULE | Refills: 0 | Status: SHIPPED | OUTPATIENT
Start: 2019-09-03 | End: 2022-02-22 | Stop reason: ALTCHOICE

## 2019-09-03 NOTE — TELEPHONE ENCOUNTER
I did give patient duloxetine on 8/13 which is 20 days ago, I sent the refill and make sure she is taking one tablet daily  She has not seen her PCP Dr Maria G Lutz for while, please schedule her with her if she needs this medication long time to discuss futher with her   NIC Govea

## 2019-09-11 NOTE — PROGRESS NOTES
FAMILY PRACTICE HEALTH MAINTENANCE OFFICE VISIT  [facility] - [address]    NAME: Shimon Mcgregor  AGE: 59 y o  SEX: female  : 1955     DATE: 2019    Assessment and Plan     There are no diagnoses linked to this encounter  · Patient Counseling:   · {LK Adult CPE Counseling VEYB:10750::"GOVNJLYNG: Stressed importance of a well balanced diet, moderation of sodium/saturated fat, caloric balance and sufficient intake of fiber","Exercise: Stressed the importance of regular exercise with a goal of 150 minutes per week","Dental Health: Discussed daily flossing and brushing and regular dental visits "}    · Immunizations reviewed: { immunization CPE list:21283}  · Discussed benefits of:  { Adult CPE Screening counselin}   BMI Counseling: There is no height or weight on file to calculate BMI  Discussed with patient's BMI with her  The BMI {VB BMI Counselin}       No follow-ups on file  Chief Complaint   No chief complaint on file  History of Present Illness     HPI    Well Adult Physical   Patient here for a comprehensive physical exam       Diet and Physical Activity  Diet: {diet; well adult:72374}  Exercise: {exericse; well adult:82349}      Depression Screen  PHQ-9 Depression Screening    PHQ-9:    Frequency of the following problems over the past two weeks:               General Health  Hearing: {WELL ADULT ULEZXDD:77440}  Vision: {vision; well adult:44657}  Dental: {dental; well adult:22730}    Reproductive Health  { Adult CPE Screening counselin}      The following portions of the patient's history were reviewed and updated as appropriate: allergies, current medications, past family history, past medical history, past social history, past surgical history and problem list     Review of Systems     Review of Systems   Constitutional: Negative  HENT: Negative  Eyes: Negative  Respiratory: Negative      Cardiovascular: Negative  Gastrointestinal: Negative  Endocrine: Negative  Genitourinary: Negative  Musculoskeletal: Negative  Skin: Negative  Allergic/Immunologic: Negative  Neurological: Negative  Hematological: Negative  Psychiatric/Behavioral: Negative  Past Medical History     No past medical history on file      Past Surgical History     Past Surgical History:   Procedure Laterality Date    HYSTERECTOMY      TONSILLECTOMY         Social History     Social History     Socioeconomic History    Marital status: /Civil Union     Spouse name: Not on file    Number of children: Not on file    Years of education: Not on file    Highest education level: Not on file   Occupational History    Not on file   Social Needs    Financial resource strain: Not on file    Food insecurity:     Worry: Not on file     Inability: Not on file    Transportation needs:     Medical: Not on file     Non-medical: Not on file   Tobacco Use    Smoking status: Never Smoker    Smokeless tobacco: Never Used   Substance and Sexual Activity    Alcohol use: Not Currently    Drug use: Never    Sexual activity: Not on file   Lifestyle    Physical activity:     Days per week: Not on file     Minutes per session: Not on file    Stress: Not on file   Relationships    Social connections:     Talks on phone: Not on file     Gets together: Not on file     Attends Hindu service: Not on file     Active member of club or organization: Not on file     Attends meetings of clubs or organizations: Not on file     Relationship status: Not on file    Intimate partner violence:     Fear of current or ex partner: Not on file     Emotionally abused: Not on file     Physically abused: Not on file     Forced sexual activity: Not on file   Other Topics Concern    Not on file   Social History Narrative    Not on file       Family History     Family History   Problem Relation Age of Onset    Breast cancer Maternal Grandmother     Heart disease Mother     Hyperlipidemia Mother     Hypothyroidism Mother     No Known Problems Father        Current Medications       Current Outpatient Medications:     DULoxetine (CYMBALTA) 30 mg delayed release capsule, Take 1 capsule (30 mg total) by mouth daily, Disp: 30 capsule, Rfl: 0    gabapentin (NEURONTIN) 300 mg capsule, Take 1 capsule (300 mg total) by mouth 2 (two) times a day for 6 days For post-herpetic neuralgia:  take 2 tablets on day 2, Then take 3 tablets on day 3 and every day after that as instructed by your doctor , Disp: 17 capsule, Rfl: 0    gabapentin (NEURONTIN) 300 mg capsule, Take 1 capsule (300 mg total) by mouth 2 (two) times a day, Disp: 60 capsule, Rfl: 0    valACYclovir (VALTREX) 1,000 mg tablet, Take 1 tablet (1,000 mg total) by mouth 3 (three) times a day for 7 days (Patient taking differently: Take 1,000 mg by mouth 3 (three) times a day Patient completed), Disp: 21 tablet, Rfl: 0     Allergies     No Known Allergies    Objective     There were no vitals taken for this visit  Physical Exam   Constitutional: She is oriented to person, place, and time  She appears well-developed and well-nourished  No distress  HENT:   Head: Normocephalic and atraumatic  Right Ear: External ear normal    Left Ear: External ear normal    Mouth/Throat: Oropharynx is clear and moist    Eyes: EOM are normal    Neck: Normal range of motion  Neck supple  No thyromegaly present  Cardiovascular: Normal rate, regular rhythm, normal heart sounds and intact distal pulses  Exam reveals no gallop and no friction rub  No murmur heard  Pulmonary/Chest: Effort normal and breath sounds normal  She has no wheezes  She has no rales  Abdominal: Soft  Bowel sounds are normal  She exhibits no mass  There is no tenderness  There is no rebound  Musculoskeletal: Normal range of motion  She exhibits no deformity  Lymphadenopathy:     She has no cervical adenopathy     Neurological: She is alert and oriented to person, place, and time  She has normal reflexes  She displays normal reflexes  No cranial nerve deficit  She exhibits normal muscle tone  Coordination normal    Skin: Skin is warm and dry  No rash noted  She is not diaphoretic  Psychiatric: She has a normal mood and affect   Her behavior is normal  Judgment and thought content normal          No exam data present        MD DRE RamirezDiamond Children's Medical CenterAS DEPT  OF [facility]

## 2019-09-16 ENCOUNTER — OFFICE VISIT (OUTPATIENT)
Dept: FAMILY MEDICINE CLINIC | Facility: CLINIC | Age: 64
End: 2019-09-16
Payer: COMMERCIAL

## 2019-09-16 VITALS
HEIGHT: 63 IN | BODY MASS INDEX: 25.45 KG/M2 | RESPIRATION RATE: 16 BRPM | DIASTOLIC BLOOD PRESSURE: 80 MMHG | OXYGEN SATURATION: 98 % | SYSTOLIC BLOOD PRESSURE: 135 MMHG | WEIGHT: 143.6 LBS | TEMPERATURE: 97.2 F | HEART RATE: 95 BPM

## 2019-09-16 DIAGNOSIS — B02.29 POST HERPETIC NEURALGIA: Primary | ICD-10-CM

## 2019-09-16 DIAGNOSIS — Z11.59 ENCOUNTER FOR HEPATITIS C SCREENING TEST FOR LOW RISK PATIENT: ICD-10-CM

## 2019-09-16 DIAGNOSIS — E78.2 MIXED HYPERLIPIDEMIA: ICD-10-CM

## 2019-09-16 PROCEDURE — 99213 OFFICE O/P EST LOW 20 MIN: CPT | Performed by: INTERNAL MEDICINE

## 2019-09-16 PROCEDURE — 3008F BODY MASS INDEX DOCD: CPT | Performed by: INTERNAL MEDICINE

## 2019-09-16 RX ORDER — MULTIVITAMIN
1 TABLET ORAL DAILY
COMMUNITY

## 2019-09-16 NOTE — PROGRESS NOTES
Subjective:      Patient ID: Ganesh Rome is a 59 y o  female  Chief Complaint   Patient presents with    Follow-up     follow uo for shingles  vf/rma       Here for follow up of post herpetic neuralgia  States it has been about 53 days since the rash started  Had been on gabapentin but it made her severely constipated  Now on cymbalta and it is helping her  She did not take yesterday and does not feel that she needs this any more  We discussed tapering the medication  No side effects  The following portions of the patient's history were reviewed and updated as appropriate: allergies, current medications, past family history, past medical history, past social history, past surgical history and problem list     Review of Systems   Constitutional: Negative  Respiratory: Negative  Cardiovascular: Negative  Neurological:        As per HPI  Current Outpatient Medications   Medication Sig Dispense Refill    DULoxetine (CYMBALTA) 30 mg delayed release capsule Take 1 capsule (30 mg total) by mouth daily 30 capsule 0    Multiple Vitamin (MULTIVITAMIN) tablet Take 1 tablet by mouth daily      Probiotic Product (PROBIOTIC-10 PO) Take by mouth       No current facility-administered medications for this visit  Objective:    /80   Pulse 95   Temp (!) 97 2 °F (36 2 °C)   Resp 16   Ht 5' 3" (1 6 m)   Wt 65 1 kg (143 lb 9 6 oz)   SpO2 98%   BMI 25 44 kg/m²        Physical Exam   Constitutional: She appears well-developed and well-nourished  Eyes: Conjunctivae are normal    Neck: Neck supple  No JVD present  No thyromegaly present  Cardiovascular: Normal rate, regular rhythm, normal heart sounds and intact distal pulses  Exam reveals no gallop and no friction rub  No murmur heard  Pulmonary/Chest: Effort normal and breath sounds normal  She has no wheezes  She has no rales  Abdominal: Soft  Bowel sounds are normal  She exhibits no distension  There is no tenderness  Musculoskeletal: She exhibits no edema  Skin:   Mild scarring R anterior chest along T12 line, no tenderness noted  Assessment/Plan:    No problem-specific Assessment & Plan notes found for this encounter  Diagnoses and all orders for this visit:    Post herpetic neuralgia  Comments:  She will taper her cymbalta by taking this every other day for 10 days and then stop  Urged to call for any recurrent symptoms  Mixed hyperlipidemia  -     CBC and differential; Future  -     Comprehensive metabolic panel; Future  -     Lipid panel; Future    Encounter for hepatitis C screening test for low risk patient  -     Hepatitis C antibody; Future    Other orders  -     Multiple Vitamin (MULTIVITAMIN) tablet; Take 1 tablet by mouth daily  -     Probiotic Product (PROBIOTIC-10 PO);  Take by mouth          Return in about 1 year (around 9/16/2020) for Annual physical        Poornima Jordan MD

## 2019-10-12 ENCOUNTER — APPOINTMENT (OUTPATIENT)
Dept: LAB | Facility: HOSPITAL | Age: 64
End: 2019-10-12
Attending: INTERNAL MEDICINE
Payer: COMMERCIAL

## 2019-10-12 ENCOUNTER — TRANSCRIBE ORDERS (OUTPATIENT)
Dept: ADMINISTRATIVE | Facility: HOSPITAL | Age: 64
End: 2019-10-12

## 2019-10-12 DIAGNOSIS — E78.2 MIXED HYPERLIPIDEMIA: ICD-10-CM

## 2019-10-12 DIAGNOSIS — Z11.59 ENCOUNTER FOR HEPATITIS C SCREENING TEST FOR LOW RISK PATIENT: ICD-10-CM

## 2019-10-12 LAB
ALBUMIN SERPL BCP-MCNC: 4.1 G/DL (ref 3.5–5)
ALP SERPL-CCNC: 57 U/L (ref 46–116)
ALT SERPL W P-5'-P-CCNC: 11 U/L (ref 12–78)
ANION GAP SERPL CALCULATED.3IONS-SCNC: 6 MMOL/L (ref 4–13)
AST SERPL W P-5'-P-CCNC: 16 U/L (ref 5–45)
BASOPHILS # BLD AUTO: 0.02 THOUSANDS/ΜL (ref 0–0.1)
BASOPHILS NFR BLD AUTO: 0 % (ref 0–1)
BILIRUB SERPL-MCNC: 1.1 MG/DL (ref 0.2–1)
BUN SERPL-MCNC: 12 MG/DL (ref 5–25)
CALCIUM SERPL-MCNC: 9.2 MG/DL (ref 8.3–10.1)
CHLORIDE SERPL-SCNC: 103 MMOL/L (ref 100–108)
CHOLEST SERPL-MCNC: 240 MG/DL (ref 50–200)
CO2 SERPL-SCNC: 28 MMOL/L (ref 21–32)
CREAT SERPL-MCNC: 0.68 MG/DL (ref 0.6–1.3)
EOSINOPHIL # BLD AUTO: 0.05 THOUSAND/ΜL (ref 0–0.61)
EOSINOPHIL NFR BLD AUTO: 1 % (ref 0–6)
ERYTHROCYTE [DISTWIDTH] IN BLOOD BY AUTOMATED COUNT: 12.6 % (ref 11.6–15.1)
GFR SERPL CREATININE-BSD FRML MDRD: 93 ML/MIN/1.73SQ M
GLUCOSE P FAST SERPL-MCNC: 99 MG/DL (ref 65–99)
HCT VFR BLD AUTO: 42 % (ref 34.8–46.1)
HCV AB SER QL: NORMAL
HDLC SERPL-MCNC: 84 MG/DL (ref 40–60)
HGB BLD-MCNC: 13.4 G/DL (ref 11.5–15.4)
IMM GRANULOCYTES # BLD AUTO: 0.01 THOUSAND/UL (ref 0–0.2)
IMM GRANULOCYTES NFR BLD AUTO: 0 % (ref 0–2)
LDLC SERPL CALC-MCNC: 143 MG/DL (ref 0–100)
LYMPHOCYTES # BLD AUTO: 1.9 THOUSANDS/ΜL (ref 0.6–4.47)
LYMPHOCYTES NFR BLD AUTO: 34 % (ref 14–44)
MCH RBC QN AUTO: 30.5 PG (ref 26.8–34.3)
MCHC RBC AUTO-ENTMCNC: 31.9 G/DL (ref 31.4–37.4)
MCV RBC AUTO: 96 FL (ref 82–98)
MONOCYTES # BLD AUTO: 0.42 THOUSAND/ΜL (ref 0.17–1.22)
MONOCYTES NFR BLD AUTO: 8 % (ref 4–12)
NEUTROPHILS # BLD AUTO: 3.19 THOUSANDS/ΜL (ref 1.85–7.62)
NEUTS SEG NFR BLD AUTO: 57 % (ref 43–75)
NONHDLC SERPL-MCNC: 156 MG/DL
NRBC BLD AUTO-RTO: 0 /100 WBCS
PLATELET # BLD AUTO: 268 THOUSANDS/UL (ref 149–390)
PMV BLD AUTO: 9.8 FL (ref 8.9–12.7)
POTASSIUM SERPL-SCNC: 3.7 MMOL/L (ref 3.5–5.3)
PROT SERPL-MCNC: 7.2 G/DL (ref 6.4–8.2)
RBC # BLD AUTO: 4.4 MILLION/UL (ref 3.81–5.12)
SODIUM SERPL-SCNC: 137 MMOL/L (ref 136–145)
TRIGL SERPL-MCNC: 65 MG/DL
WBC # BLD AUTO: 5.59 THOUSAND/UL (ref 4.31–10.16)

## 2019-10-12 PROCEDURE — 85025 COMPLETE CBC W/AUTO DIFF WBC: CPT

## 2019-10-12 PROCEDURE — 80061 LIPID PANEL: CPT

## 2019-10-12 PROCEDURE — 36415 COLL VENOUS BLD VENIPUNCTURE: CPT

## 2019-10-12 PROCEDURE — 80053 COMPREHEN METABOLIC PANEL: CPT

## 2019-10-12 PROCEDURE — 86803 HEPATITIS C AB TEST: CPT

## 2019-11-15 ENCOUNTER — TRANSCRIBE ORDERS (OUTPATIENT)
Dept: ADMINISTRATIVE | Facility: HOSPITAL | Age: 64
End: 2019-11-15

## 2019-11-15 DIAGNOSIS — Z12.31 SCREENING MAMMOGRAM FOR HIGH-RISK PATIENT: Primary | ICD-10-CM

## 2020-01-21 ENCOUNTER — HOSPITAL ENCOUNTER (OUTPATIENT)
Dept: RADIOLOGY | Facility: HOSPITAL | Age: 65
Discharge: HOME | End: 2020-01-21
Attending: OBSTETRICS & GYNECOLOGY
Payer: COMMERCIAL

## 2020-01-21 VITALS — BODY MASS INDEX: 25.34 KG/M2 | HEIGHT: 63 IN | WEIGHT: 143 LBS

## 2020-01-21 DIAGNOSIS — Z12.31 SCREENING MAMMOGRAM FOR HIGH-RISK PATIENT: ICD-10-CM

## 2020-01-21 PROCEDURE — 77063 BREAST TOMOSYNTHESIS BI: CPT

## 2020-01-21 PROCEDURE — 77067 SCR MAMMO BI INCL CAD: CPT

## 2020-02-04 ENCOUNTER — TRANSCRIBE ORDERS (OUTPATIENT)
Dept: ADMINISTRATIVE | Facility: HOSPITAL | Age: 65
End: 2020-02-04

## 2020-02-04 DIAGNOSIS — M81.0 OSTEOPOROSIS, UNSPECIFIED OSTEOPOROSIS TYPE, UNSPECIFIED PATHOLOGICAL FRACTURE PRESENCE: Primary | ICD-10-CM

## 2020-07-21 ENCOUNTER — HOSPITAL ENCOUNTER (OUTPATIENT)
Dept: RADIOLOGY | Facility: HOSPITAL | Age: 65
Discharge: HOME | End: 2020-07-21
Attending: OBSTETRICS & GYNECOLOGY
Payer: COMMERCIAL

## 2020-07-21 DIAGNOSIS — M81.0 OSTEOPOROSIS, UNSPECIFIED OSTEOPOROSIS TYPE, UNSPECIFIED PATHOLOGICAL FRACTURE PRESENCE: ICD-10-CM

## 2020-07-21 PROCEDURE — 77080 DXA BONE DENSITY AXIAL: CPT

## 2021-09-20 ENCOUNTER — OFFICE VISIT (OUTPATIENT)
Dept: FAMILY MEDICINE CLINIC | Facility: CLINIC | Age: 66
End: 2021-09-20
Payer: MEDICARE

## 2021-09-20 VITALS
BODY MASS INDEX: 25.87 KG/M2 | DIASTOLIC BLOOD PRESSURE: 80 MMHG | HEART RATE: 80 BPM | SYSTOLIC BLOOD PRESSURE: 114 MMHG | WEIGHT: 146 LBS | RESPIRATION RATE: 16 BRPM | HEIGHT: 63 IN | TEMPERATURE: 99.3 F

## 2021-09-20 DIAGNOSIS — H66.93 ACUTE OTITIS MEDIA, BILATERAL: Primary | ICD-10-CM

## 2021-09-20 PROCEDURE — 99213 OFFICE O/P EST LOW 20 MIN: CPT | Performed by: FAMILY MEDICINE

## 2021-09-20 RX ORDER — AMOXICILLIN 875 MG/1
875 TABLET, COATED ORAL 2 TIMES DAILY
Qty: 14 TABLET | Refills: 0 | Status: SHIPPED | OUTPATIENT
Start: 2021-09-20 | End: 2021-09-27

## 2021-09-20 NOTE — PROGRESS NOTES
Assessment/Plan:       Diagnoses and all orders for this visit:    Acute otitis media, bilateral  -     amoxicillin (AMOXIL) 875 mg tablet; Take 1 tablet (875 mg total) by mouth 2 (two) times a day for 7 days        Subjective:      Patient ID: Shimon Mcgregor is a 77 y o  female  Earache   There is pain in both ears  This is a new problem  The current episode started in the past 7 days  The problem occurs every few hours  The problem has been gradually improving  There has been no fever  Associated symptoms include headaches  Pertinent negatives include no abdominal pain, coughing, diarrhea, ear discharge, hearing loss, neck pain, rash, rhinorrhea, sore throat or vomiting  She has tried acetaminophen for the symptoms  The treatment provided no relief  There is no history of a chronic ear infection, hearing loss or a tympanostomy tube  The following portions of the patient's history were reviewed and updated as appropriate: allergies, current medications, past family history, past medical history, past social history, past surgical history and problem list     Review of Systems   HENT: Positive for ear pain  Negative for ear discharge, hearing loss, rhinorrhea and sore throat  Respiratory: Negative for cough  Gastrointestinal: Negative for abdominal pain, diarrhea and vomiting  Musculoskeletal: Negative for neck pain  Skin: Negative for rash  Neurological: Positive for headaches  Objective:      /80   Pulse 80   Temp 99 3 °F (37 4 °C)   Resp 16   Ht 5' 3" (1 6 m)   Wt 66 2 kg (146 lb)   BMI 25 86 kg/m²          Physical Exam  Constitutional:       General: She is not in acute distress  Appearance: She is well-developed  She is not diaphoretic  HENT:      Head: Normocephalic and atraumatic  Right Ear: Tympanic membrane, ear canal and external ear normal  There is no impacted cerumen        Left Ear: Tympanic membrane, ear canal and external ear normal  There is no impacted cerumen  Eyes:      General: No scleral icterus  Right eye: No discharge  Left eye: No discharge  Conjunctiva/sclera: Conjunctivae normal    Pulmonary:      Effort: Pulmonary effort is normal    Musculoskeletal:      Cervical back: Normal range of motion  Skin:     General: Skin is warm  Neurological:      Mental Status: She is alert and oriented to person, place, and time  Psychiatric:         Behavior: Behavior normal          Thought Content:  Thought content normal          Judgment: Judgment normal

## 2021-09-27 ENCOUNTER — TELEPHONE (OUTPATIENT)
Dept: FAMILY MEDICINE CLINIC | Facility: CLINIC | Age: 66
End: 2021-09-27

## 2021-09-27 DIAGNOSIS — H66.93 ACUTE OTITIS MEDIA, BILATERAL: Primary | ICD-10-CM

## 2021-09-27 NOTE — TELEPHONE ENCOUNTER
I sent ear drops  She can try these  She completed a course of amoxicillin, she does need a refill  If the drops do not help, recommend that she come in for reevaluation or see an ENT doctor

## 2021-09-27 NOTE — TELEPHONE ENCOUNTER
DR WHEELER   Patient was seen last week for an ear infection  She would like to know why she didn't get ear drops  She also would like a refill of her antibiotics

## 2021-10-17 ENCOUNTER — NURSE TRIAGE (OUTPATIENT)
Dept: OTHER | Facility: OTHER | Age: 66
End: 2021-10-17

## 2021-10-17 DIAGNOSIS — Z20.828 SARS-ASSOCIATED CORONAVIRUS EXPOSURE: Primary | ICD-10-CM

## 2021-10-17 PROCEDURE — U0005 INFEC AGEN DETEC AMPLI PROBE: HCPCS | Performed by: INTERNAL MEDICINE

## 2021-10-17 PROCEDURE — U0003 INFECTIOUS AGENT DETECTION BY NUCLEIC ACID (DNA OR RNA); SEVERE ACUTE RESPIRATORY SYNDROME CORONAVIRUS 2 (SARS-COV-2) (CORONAVIRUS DISEASE [COVID-19]), AMPLIFIED PROBE TECHNIQUE, MAKING USE OF HIGH THROUGHPUT TECHNOLOGIES AS DESCRIBED BY CMS-2020-01-R: HCPCS | Performed by: INTERNAL MEDICINE

## 2021-10-18 ENCOUNTER — TELEPHONE (OUTPATIENT)
Dept: FAMILY MEDICINE CLINIC | Facility: CLINIC | Age: 66
End: 2021-10-18

## 2021-11-02 ENCOUNTER — HOSPITAL ENCOUNTER (OUTPATIENT)
Dept: RADIOLOGY | Facility: HOSPITAL | Age: 66
Discharge: HOME | End: 2021-11-02
Payer: MEDICARE

## 2021-11-02 VITALS — HEIGHT: 63 IN | WEIGHT: 140 LBS | BODY MASS INDEX: 24.8 KG/M2

## 2021-11-02 DIAGNOSIS — Z12.31 ENCOUNTER FOR SCREENING MAMMOGRAM FOR MALIGNANT NEOPLASM OF BREAST: ICD-10-CM

## 2021-11-02 PROCEDURE — 77067 SCR MAMMO BI INCL CAD: CPT

## 2021-11-02 PROCEDURE — 77063 BREAST TOMOSYNTHESIS BI: CPT

## 2021-11-18 ENCOUNTER — TELEPHONE (OUTPATIENT)
Dept: FAMILY MEDICINE CLINIC | Facility: CLINIC | Age: 66
End: 2021-11-18

## 2021-11-18 DIAGNOSIS — H66.93 ACUTE OTITIS MEDIA, BILATERAL: Primary | ICD-10-CM

## 2021-11-23 ENCOUNTER — EVALUATION (OUTPATIENT)
Dept: PHYSICAL THERAPY | Facility: CLINIC | Age: 66
End: 2021-11-23
Payer: MEDICARE

## 2021-11-23 ENCOUNTER — OFFICE VISIT (OUTPATIENT)
Dept: AUDIOLOGY | Facility: CLINIC | Age: 66
End: 2021-11-23
Payer: MEDICARE

## 2021-11-23 ENCOUNTER — OFFICE VISIT (OUTPATIENT)
Dept: OTOLARYNGOLOGY | Facility: CLINIC | Age: 66
End: 2021-11-23
Payer: MEDICARE

## 2021-11-23 VITALS — BODY MASS INDEX: 25.34 KG/M2 | HEIGHT: 63 IN | TEMPERATURE: 97.4 F | WEIGHT: 143 LBS

## 2021-11-23 VITALS — SYSTOLIC BLOOD PRESSURE: 140 MMHG | DIASTOLIC BLOOD PRESSURE: 84 MMHG

## 2021-11-23 DIAGNOSIS — H90.3 SENSORY HEARING LOSS, BILATERAL: Primary | ICD-10-CM

## 2021-11-23 DIAGNOSIS — E07.89 THYROID FULLNESS: ICD-10-CM

## 2021-11-23 DIAGNOSIS — R42 VERTIGO: Primary | ICD-10-CM

## 2021-11-23 DIAGNOSIS — F41.9 ANXIETY DISORDER, UNSPECIFIED TYPE: ICD-10-CM

## 2021-11-23 DIAGNOSIS — H66.93 ACUTE OTITIS MEDIA, BILATERAL: ICD-10-CM

## 2021-11-23 DIAGNOSIS — H93.8X2 SENSATION OF FULLNESS IN LEFT EAR: ICD-10-CM

## 2021-11-23 DIAGNOSIS — E55.9 VITAMIN D DEFICIENCY: ICD-10-CM

## 2021-11-23 DIAGNOSIS — E74.89 OTHER SPECIFIED DISORDERS OF CARBOHYDRATE METABOLISM (HCC): ICD-10-CM

## 2021-11-23 DIAGNOSIS — R42 DIZZINESS: Primary | ICD-10-CM

## 2021-11-23 PROBLEM — J31.0 CHRONIC RHINITIS: Status: ACTIVE | Noted: 2021-11-23

## 2021-11-23 PROCEDURE — 92557 COMPREHENSIVE HEARING TEST: CPT | Performed by: AUDIOLOGIST

## 2021-11-23 PROCEDURE — 99204 OFFICE O/P NEW MOD 45 MIN: CPT | Performed by: NURSE PRACTITIONER

## 2021-11-23 PROCEDURE — 97162 PT EVAL MOD COMPLEX 30 MIN: CPT | Performed by: PHYSICAL THERAPIST

## 2021-11-23 PROCEDURE — 92567 TYMPANOMETRY: CPT | Performed by: AUDIOLOGIST

## 2021-11-29 ENCOUNTER — APPOINTMENT (OUTPATIENT)
Dept: LAB | Facility: HOSPITAL | Age: 66
End: 2021-11-29
Payer: MEDICARE

## 2021-11-29 DIAGNOSIS — E07.89 THYROID FULLNESS: ICD-10-CM

## 2021-11-29 DIAGNOSIS — E55.9 VITAMIN D DEFICIENCY: ICD-10-CM

## 2021-11-29 DIAGNOSIS — R42 VERTIGO: ICD-10-CM

## 2021-11-29 DIAGNOSIS — E74.89 OTHER SPECIFIED DISORDERS OF CARBOHYDRATE METABOLISM (HCC): ICD-10-CM

## 2021-11-29 DIAGNOSIS — F41.9 ANXIETY DISORDER, UNSPECIFIED TYPE: ICD-10-CM

## 2021-11-29 DIAGNOSIS — E87.6 HYPOKALEMIA: Primary | ICD-10-CM

## 2021-11-29 DIAGNOSIS — H93.8X2 SENSATION OF FULLNESS IN LEFT EAR: ICD-10-CM

## 2021-11-29 LAB
25(OH)D3 SERPL-MCNC: 33.6 NG/ML (ref 30–100)
ALBUMIN SERPL BCP-MCNC: 4.8 G/DL (ref 3.4–4.8)
ALP SERPL-CCNC: 49.7 U/L (ref 35–140)
ALT SERPL W P-5'-P-CCNC: 14 U/L (ref 5–54)
ANION GAP SERPL CALCULATED.3IONS-SCNC: 8 MMOL/L (ref 4–13)
AST SERPL W P-5'-P-CCNC: 17 U/L (ref 15–41)
BASOPHILS # BLD AUTO: 0.03 THOUSANDS/ΜL (ref 0–0.1)
BASOPHILS NFR BLD AUTO: 0 % (ref 0–1)
BILIRUB SERPL-MCNC: 1.34 MG/DL (ref 0.3–1.2)
BUN SERPL-MCNC: 13 MG/DL (ref 6–20)
CALCIUM SERPL-MCNC: 9.7 MG/DL (ref 8.4–10.2)
CHLORIDE SERPL-SCNC: 100 MMOL/L (ref 96–108)
CO2 SERPL-SCNC: 28 MMOL/L (ref 22–33)
CREAT SERPL-MCNC: 0.71 MG/DL (ref 0.4–1.1)
CRP SERPL QL: <3 MG/L
EOSINOPHIL # BLD AUTO: 0.02 THOUSAND/ΜL (ref 0–0.61)
EOSINOPHIL NFR BLD AUTO: 0 % (ref 0–6)
ERYTHROCYTE [DISTWIDTH] IN BLOOD BY AUTOMATED COUNT: 12.6 % (ref 11.6–15.1)
ERYTHROCYTE [SEDIMENTATION RATE] IN BLOOD: 14 MM/HOUR (ref 0–29)
EST. AVERAGE GLUCOSE BLD GHB EST-MCNC: 111 MG/DL
GFR SERPL CREATININE-BSD FRML MDRD: 89 ML/MIN/1.73SQ M
GLUCOSE P FAST SERPL-MCNC: 112 MG/DL (ref 70–105)
HBA1C MFR BLD: 5.5 %
HCT VFR BLD AUTO: 42.9 % (ref 34.8–46.1)
HGB BLD-MCNC: 13.7 G/DL (ref 11.5–15.4)
IMM GRANULOCYTES # BLD AUTO: 0.04 THOUSAND/UL (ref 0–0.2)
IMM GRANULOCYTES NFR BLD AUTO: 1 % (ref 0–2)
LYMPHOCYTES # BLD AUTO: 1.68 THOUSANDS/ΜL (ref 0.6–4.47)
LYMPHOCYTES NFR BLD AUTO: 19 % (ref 14–44)
MCH RBC QN AUTO: 30.5 PG (ref 26.8–34.3)
MCHC RBC AUTO-ENTMCNC: 31.9 G/DL (ref 31.4–37.4)
MCV RBC AUTO: 96 FL (ref 82–98)
MONOCYTES # BLD AUTO: 0.5 THOUSAND/ΜL (ref 0.17–1.22)
MONOCYTES NFR BLD AUTO: 6 % (ref 4–12)
NEUTROPHILS # BLD AUTO: 6.46 THOUSANDS/ΜL (ref 1.85–7.62)
NEUTS SEG NFR BLD AUTO: 74 % (ref 43–75)
NRBC BLD AUTO-RTO: 0 /100 WBCS
PLATELET # BLD AUTO: 300 THOUSANDS/UL (ref 149–390)
PMV BLD AUTO: 9.9 FL (ref 8.9–12.7)
POTASSIUM SERPL-SCNC: 3.3 MMOL/L (ref 3.5–5)
PROT SERPL-MCNC: 7.6 G/DL (ref 6.4–8.3)
PTH-INTACT SERPL-MCNC: 41 PG/ML (ref 18.4–80.1)
RBC # BLD AUTO: 4.49 MILLION/UL (ref 3.81–5.12)
SODIUM SERPL-SCNC: 136 MMOL/L (ref 133–145)
T4 SERPL-MCNC: 10.1 UG/DL (ref 4.7–13.3)
TSH SERPL DL<=0.05 MIU/L-ACNC: 1.02 UIU/ML (ref 0.34–5.6)
WBC # BLD AUTO: 8.73 THOUSAND/UL (ref 4.31–10.16)

## 2021-11-29 PROCEDURE — 82306 VITAMIN D 25 HYDROXY: CPT

## 2021-11-29 PROCEDURE — 86038 ANTINUCLEAR ANTIBODIES: CPT

## 2021-11-29 PROCEDURE — 36415 COLL VENOUS BLD VENIPUNCTURE: CPT

## 2021-11-29 PROCEDURE — 85025 COMPLETE CBC W/AUTO DIFF WBC: CPT

## 2021-11-29 PROCEDURE — 83036 HEMOGLOBIN GLYCOSYLATED A1C: CPT

## 2021-11-29 PROCEDURE — 84443 ASSAY THYROID STIM HORMONE: CPT

## 2021-11-29 PROCEDURE — 86430 RHEUMATOID FACTOR TEST QUAL: CPT

## 2021-11-29 PROCEDURE — 86140 C-REACTIVE PROTEIN: CPT

## 2021-11-29 PROCEDURE — 85652 RBC SED RATE AUTOMATED: CPT

## 2021-11-29 PROCEDURE — 83970 ASSAY OF PARATHORMONE: CPT

## 2021-11-29 PROCEDURE — 84436 ASSAY OF TOTAL THYROXINE: CPT

## 2021-11-29 PROCEDURE — 80053 COMPREHEN METABOLIC PANEL: CPT

## 2021-11-29 RX ORDER — POTASSIUM CHLORIDE 20 MEQ/1
TABLET, EXTENDED RELEASE ORAL
Qty: 4 TABLET | Refills: 0 | Status: SHIPPED | OUTPATIENT
Start: 2021-11-29 | End: 2022-03-24

## 2021-11-30 LAB — RHEUMATOID FACT SER QL LA: NEGATIVE

## 2021-12-01 ENCOUNTER — TELEPHONE (OUTPATIENT)
Dept: OTOLARYNGOLOGY | Facility: CLINIC | Age: 66
End: 2021-12-01

## 2021-12-01 LAB — RYE IGE QN: NEGATIVE

## 2021-12-03 ENCOUNTER — OFFICE VISIT (OUTPATIENT)
Dept: PHYSICAL THERAPY | Facility: CLINIC | Age: 66
End: 2021-12-03
Payer: MEDICARE

## 2021-12-03 DIAGNOSIS — R42 DIZZINESS: Primary | ICD-10-CM

## 2021-12-03 PROCEDURE — 97112 NEUROMUSCULAR REEDUCATION: CPT | Performed by: PHYSICAL THERAPIST

## 2021-12-03 PROCEDURE — 97530 THERAPEUTIC ACTIVITIES: CPT | Performed by: PHYSICAL THERAPIST

## 2021-12-06 ENCOUNTER — OFFICE VISIT (OUTPATIENT)
Dept: PHYSICAL THERAPY | Facility: CLINIC | Age: 66
End: 2021-12-06
Payer: MEDICARE

## 2021-12-06 DIAGNOSIS — R42 DIZZINESS: Primary | ICD-10-CM

## 2021-12-06 PROCEDURE — 97112 NEUROMUSCULAR REEDUCATION: CPT | Performed by: PHYSICAL THERAPIST

## 2021-12-10 ENCOUNTER — APPOINTMENT (OUTPATIENT)
Dept: PHYSICAL THERAPY | Facility: CLINIC | Age: 66
End: 2021-12-10
Payer: MEDICARE

## 2021-12-13 ENCOUNTER — OFFICE VISIT (OUTPATIENT)
Dept: PHYSICAL THERAPY | Facility: CLINIC | Age: 66
End: 2021-12-13
Payer: MEDICARE

## 2021-12-13 DIAGNOSIS — R42 DIZZINESS: Primary | ICD-10-CM

## 2021-12-13 PROCEDURE — 97112 NEUROMUSCULAR REEDUCATION: CPT | Performed by: PHYSICAL THERAPIST

## 2021-12-17 ENCOUNTER — OFFICE VISIT (OUTPATIENT)
Dept: PHYSICAL THERAPY | Facility: CLINIC | Age: 66
End: 2021-12-17
Payer: MEDICARE

## 2021-12-17 DIAGNOSIS — R42 DIZZINESS: Primary | ICD-10-CM

## 2021-12-17 PROCEDURE — 97112 NEUROMUSCULAR REEDUCATION: CPT | Performed by: PHYSICAL THERAPIST

## 2021-12-17 PROCEDURE — 97530 THERAPEUTIC ACTIVITIES: CPT | Performed by: PHYSICAL THERAPIST

## 2021-12-20 ENCOUNTER — EVALUATION (OUTPATIENT)
Dept: PHYSICAL THERAPY | Facility: CLINIC | Age: 66
End: 2021-12-20
Payer: MEDICARE

## 2021-12-20 DIAGNOSIS — R42 DIZZINESS: Primary | ICD-10-CM

## 2021-12-20 PROCEDURE — 97530 THERAPEUTIC ACTIVITIES: CPT | Performed by: PHYSICAL THERAPIST

## 2021-12-27 ENCOUNTER — OFFICE VISIT (OUTPATIENT)
Dept: PHYSICAL THERAPY | Facility: CLINIC | Age: 66
End: 2021-12-27
Payer: MEDICARE

## 2021-12-27 DIAGNOSIS — R42 DIZZINESS: Primary | ICD-10-CM

## 2021-12-27 PROCEDURE — 97112 NEUROMUSCULAR REEDUCATION: CPT

## 2022-01-03 ENCOUNTER — APPOINTMENT (OUTPATIENT)
Dept: PHYSICAL THERAPY | Facility: CLINIC | Age: 67
End: 2022-01-03
Payer: MEDICARE

## 2022-01-04 ENCOUNTER — TELEPHONE (OUTPATIENT)
Dept: OTOLARYNGOLOGY | Facility: CLINIC | Age: 67
End: 2022-01-04

## 2022-01-04 DIAGNOSIS — R42 VERTIGO: Primary | ICD-10-CM

## 2022-01-04 DIAGNOSIS — G89.29 CHRONIC NONINTRACTABLE HEADACHE, UNSPECIFIED HEADACHE TYPE: ICD-10-CM

## 2022-01-04 DIAGNOSIS — R51.9 CHRONIC NONINTRACTABLE HEADACHE, UNSPECIFIED HEADACHE TYPE: ICD-10-CM

## 2022-01-04 RX ORDER — MECLIZINE HYDROCHLORIDE 25 MG/1
25 TABLET ORAL EVERY 8 HOURS PRN
Qty: 30 TABLET | Refills: 0 | Status: SHIPPED | OUTPATIENT
Start: 2022-01-04 | End: 2022-02-28 | Stop reason: SDUPTHER

## 2022-01-04 RX ORDER — ALPRAZOLAM 0.5 MG/1
0.5 TABLET ORAL
Qty: 2 TABLET | Refills: 0 | Status: SHIPPED | OUTPATIENT
Start: 2022-01-04 | End: 2022-02-22 | Stop reason: ALTCHOICE

## 2022-01-04 NOTE — TELEPHONE ENCOUNTER
Pt contacted office  Unable to attend today's appt  Feeling worse after last PT session with tiredness, nausea, headache  Tinnitus and vertigo are persistent  Discussed options and recommend obtain MRI brain with IAC at this time  Pt agreed    May take Xanax prior to procedure

## 2022-01-06 ENCOUNTER — APPOINTMENT (OUTPATIENT)
Dept: PHYSICAL THERAPY | Facility: CLINIC | Age: 67
End: 2022-01-06
Payer: MEDICARE

## 2022-01-11 ENCOUNTER — APPOINTMENT (OUTPATIENT)
Dept: PHYSICAL THERAPY | Facility: CLINIC | Age: 67
End: 2022-01-11
Payer: MEDICARE

## 2022-01-13 ENCOUNTER — OFFICE VISIT (OUTPATIENT)
Dept: PHYSICAL THERAPY | Facility: CLINIC | Age: 67
End: 2022-01-13
Payer: MEDICARE

## 2022-01-13 DIAGNOSIS — R42 DIZZINESS: Primary | ICD-10-CM

## 2022-01-13 PROCEDURE — 97530 THERAPEUTIC ACTIVITIES: CPT | Performed by: PHYSICAL THERAPIST

## 2022-01-13 PROCEDURE — 97112 NEUROMUSCULAR REEDUCATION: CPT | Performed by: PHYSICAL THERAPIST

## 2022-01-13 NOTE — PROGRESS NOTES
Daily Note   IE: 21  POC: 2/15/22  PN: 21    Today's date: 2022  Patient name: Liang Cruz  : 1955  MRN: [id number]  Referring provider: NIC Bernabe  Dx:   Encounter Diagnosis     ICD-10-CM    1  Dizziness  R42               Insurance:  AMA/CMS Eval/ Re-eval POC expires Al Rogue #/ Referral # Total   Visits  Start date  Expiration date Extension  Visit limitation? PT only or  PT+OT? Co-Insurance   CMS (Medicare) 11/23/21 2/15/22  Not required NA NA NA NA BOMN PT No                                                                  Subjective: Patient reports to session with 5/10 dizziness/nausea      Objective: See treatment diary below    Treadmill: 10 minutes @ 1 2 mph, 2 UE support  STS w/ feet on foam  FTEO/FTEC w/ foam: 60 sec, min sway  FTEO w/ foam: HT/HN, 12x ea  FTEC w/ foam: HT/HN, 12x ea  VOR Cx on foam (15x)  - H: 0/10  - V: 0/10    TA:   Patient education    Assessment: Patient tolerated treatment well today  Trialed treadmill today, with no increase in dizziness noted  Added foam to STS, without any sway or imbalance observed  Patient demonstrates minimal sway when FTEC on foam performing HT/HN, but was able to maintain balance independently  Discussed with patient multiple causes of dizziness and deep breathing techniques to help reduce dizziness  Pt will benefit from continued skilled therapy to decrease dizziness sx, promote adaptation of vestibular system, improve static and dynamic balance, and promote restoration of PLOF  Plan: Continue per plan of care          Outcome Measures Initial Eval   PN  21       mCTSIB  - FTEO (firm)  - FTEC (firm)  - FTEO (foam)  - FTEC (foam)   Defer   30 sec  30 sec  30 sec  30 sec       DGI Defer        FGA Defer 25/30       10 meter Defer        DHI /100

## 2022-01-17 ENCOUNTER — OFFICE VISIT (OUTPATIENT)
Dept: PHYSICAL THERAPY | Facility: CLINIC | Age: 67
End: 2022-01-17
Payer: MEDICARE

## 2022-01-17 DIAGNOSIS — R42 DIZZINESS: Primary | ICD-10-CM

## 2022-01-17 PROCEDURE — 97112 NEUROMUSCULAR REEDUCATION: CPT | Performed by: PHYSICAL THERAPIST

## 2022-01-17 NOTE — PROGRESS NOTES
Daily Note   IE: 21  POC: 2/15/22  PN: 21    Today's date: 2022  Patient name: Soraya Hill  : 1955  MRN: [id number]  Referring provider: NIC Pinto  Dx:   Encounter Diagnosis     ICD-10-CM    1  Dizziness  R42               Insurance:  AMA/CMS Eval/ Re-eval POC expires Carole Daughters #/ Referral # Total   Visits  Start date  Expiration date Extension  Visit limitation? PT only or  PT+OT? Co-Insurance   CMS (Medicare) 11/23/21 2/15/22  Not required NA NA NA NA BOMN PT No                                                                  Subjective: Patient reports to session with 2-3/10 dizziness/nausea      Objective: See treatment diary below    BP: 126/84  HR: 76 bpm  SpO2: 96%    - FWD ambulation: 50ft x2  - BWD ambulation: 50ft x2  - EC ambulation: 50ft x2  - FTEO w/ foam beam: HT/HN, 12x ea  - FTEC w/ foam beam: HT/HN, 12x ea  - Ambulation w/ HT/HN: 30 ft x3 ea  - FWD/BWD ambulation w/ VOR Cx: 2 laps x 30 ft  - Standing ball pass on foam: 20x CW/CCW      Assessment: Patient tolerated treatment fairly  She reports mild increases in dizziness, up to 5/10 during ambulation with EC, demonstrating increased reliance on visual input to maintain balance  She is able to maintain balance independently with all foam exercises today, with minimal sway observed that is corrected with appropriate ankle strategy  Pt will benefit from continued skilled therapy to decrease dizziness sx, promote adaptation of vestibular system, improve static and dynamic balance, and promote restoration of PLOF  Plan: Continue per plan of care          Outcome Measures Initial Eval   PN  21       mCTSIB  - FTEO (firm)  - FTEC (firm)  - FTEO (foam)  - FTEC (foam)   Defer   30 sec  30 sec  30 sec  30 sec       DGI Defer        FGA Defer 25/30       10 meter Defer        DHI /100

## 2022-01-18 DIAGNOSIS — R42 VERTIGO: Primary | ICD-10-CM

## 2022-01-20 ENCOUNTER — TELEPHONE (OUTPATIENT)
Dept: FAMILY MEDICINE CLINIC | Facility: CLINIC | Age: 67
End: 2022-01-20

## 2022-01-20 ENCOUNTER — OFFICE VISIT (OUTPATIENT)
Dept: PHYSICAL THERAPY | Facility: CLINIC | Age: 67
End: 2022-01-20
Payer: MEDICARE

## 2022-01-20 DIAGNOSIS — R42 DIZZINESS: Primary | ICD-10-CM

## 2022-01-20 PROCEDURE — 97530 THERAPEUTIC ACTIVITIES: CPT | Performed by: PHYSICAL THERAPIST

## 2022-01-20 NOTE — PROGRESS NOTES
PT Progress Note  Today's date: 2022  Patient name: Neetu Watson  : 1955  MRN: [id number]  Referring provider: NIC Ivan  Dx:   Encounter Diagnosis     ICD-10-CM    1  Dizziness  R42          Assessment  Assessment details: Patient is a 77 y o  Female who presents to skilled outpatient PT with dizziness that varies throughout the day  She continues to report dizziness, increased pressure in her sinuses, and occasional ringing in her ears  Patient scores WNL for her FGA, showing appropriate dynamic balance  Based on her [facility] [address], patient scores as moderate perception of handicap  She demonstrates minor increases in dizziness with VOR x1, but continues to work on VOR exercises as part of HEP  Plan to use sinus release techniques during next session to attempt to reduce pressure feeling  Patient will benefit from skilled PT services to reduce her dizziness and help her return to her PLOF  Patient verbalized understanding of POC  Please contact me if you have any questions or recommendations  Thank you for the referral and the opportunity to share in [employer] care        Cut off score   All date taken from [facility] Neuro Section or Rehab Measures    DGI:  MDC for Vestibular Disorders: 4 points  [facility] for Geriatrics/Community Dwelling Older Adults: 3 Points  Falls risk cut off: <19/24    FGA:  MCID: 4 points  Geriatrics/Community Dwelling Older Adults: </= 22/30 fall risk  Geriatrics/Community Dwelling Older Adults: </= 20/30 unexplained falls in the next 6 months  Parkinsons: </= 18/30 fall risk    mCTSIB (normed on ages 19-56, lower number is less sway or better static balance)  Eyes open firm surface (norm 0 21-0 48)  Eyes closed firm surface (norm 0 48-0 99)  Eyes open foam surface (norm 0 38-0 71)  Eyes closed foam surface (norm 0 70-2 22)    DHI:  0-39: low perception of handicap  40-69: moderate perception of handicap  : severe perception of handicap  > 60: increased risk for falls        Impairments: activity intolerance, impaired balance, lacks appropriate, HEP, safety issue  Understanding of Dx/Px/POC: Good  Prognosis: Good      Goals      Vestibular Short Term Goals:  - Patient will display improved cervical spine STM by 50% to encourage improved AROM during functional tasks  - Patient will be independent with simple HEP- MET  - Patient will tolerate 60 seconds of oculomotor exercises with minimal increase in symptoms- NOT MET  - Patient will be able to tolerate 30 seconds with eyes closed on foam surface without any loss of balance demonstrating improvement in vestibular system- MET  - Patient will improve FGA score by 4 points per MDC to promote improved safety with dynamic tasks    Vestibular Long Term Goals:  - Patient will display decreased forward head and rounded shoulders to promote improved resting posture and cervical mobility  - Patient will be independent with complex HEP  - Patient will tolerate >=2 minutes of oculomotor exercises to facilitate return to reading and computer work  - Patient will demonstrate ability to perform HT in gait without veering  - Patient will score low risk for falls with FGA test with score of 23/30 or higher per current research data  - Patient will report baseline dizziness of 1/10 or less   - Patient will report 2/10 dizziness or less with visual stimulating surround with duration of 2 minutes   - Patient will report subjective improvement to 90% or higher to promote return to PLOF          Plan  Plan details: re-test positionals  Patient would benefit from: PT Eval  Planned therapy interventions: balance, HEP, manual therapy, neuromuscular re-education, patient education  Frequency: 1-3x per week  Duration in weeks: 12  Plan of Care beginning date: 11/23/2021  Plan of Care expiration date: 12 weeks - 2/15/2022  Treatment plan discussed with: Patient        Subjective Evaluation    History of Present Illness  Mechanism of injury: Patient reports that she has had dizziness for the last month that has been positional, states she is nervous about when it is going to happen next  States it happens pretty much everyday but is better when she is at home    Update (21): Patient reports to therapy stating she had a good weekend, but woke up this morning with ear pain that she describes as pressure  She currently rates her dizziness at 3-4/10, states it has not got worse over the last month  Is currently reporting light ringing in her ears and also notes she finished her antibiotics on Saturday  Update (22): Patient states that she is not feeling well today, reporting increased dizziness, increased "head pressure", and nauseous  She reports that her symptoms "come and go" with some days being better than others  She is scheduled for MRI in February       Dizziness Subjective  How long does dizziness last: 30 minutes  How would you describe the dizziness: in a fog, cant concentrate  Rolling in bed: No  Supine to/from sit: Yes       Pain  Current pain ratin/10      Treatments  Previous treatment: PT,         Objective     Vitals  - BP: 136/86  - HR: 87 bpm  - SpO2: 98%    BPPV Objective  Integrity Testing  - mVBI: Normal  - Sharp Gabriel: Normal  - Alar Ligament Stability Test: Normal  - Palpation: hypertonic L UT, LS, Suboccipitals       Oculomotor Screen   Dizziness 0 / 10     -Smooth Pursuits- Central   Normal, Dizziness 0/10    -Gaze holding nystagmus-   Normal, Dizziness 0/10    -Spontaneous nystagmus room light-  Normal, Dizziness 0/10    -Near Point Convergence- Central   Normal, 4 /CM ( 4 inch/ 6CM norm)   Dizziness 0/10    -Saccades- Central   Horizontal   Normal, Dizziness 0/10  Vertical   Normal, Dizziness 0/10    -VOR Screen / Motion Sensitivity-   Horizontal   Normal, Dizziness 1/10  Vertical   Normal, Dizziness 3/10      -VOR Cancel- Central   Horizontal Abnormal, Dizziness 0/10  Vertical   Normal, Dizziness 0/10            Outcome Measures Initial Eval   PN  12/20/21 PN  1/20/22      mCTSIB  - FTEO (firm)  - FTEC (firm)  - FTEO (foam)  - FTEC (foam)   Defer   30 sec  30 sec  30 sec  30 sec       DGI Defer        FGA Defer 25/30 28/30      10 meter Defer        DHI /100  62/100                                                        Precautions: No past medical history on file

## 2022-01-24 ENCOUNTER — OFFICE VISIT (OUTPATIENT)
Dept: PHYSICAL THERAPY | Facility: CLINIC | Age: 67
End: 2022-01-24
Payer: MEDICARE

## 2022-01-24 DIAGNOSIS — R42 DIZZINESS: Primary | ICD-10-CM

## 2022-01-24 PROCEDURE — 97140 MANUAL THERAPY 1/> REGIONS: CPT | Performed by: PHYSICAL THERAPIST

## 2022-01-24 PROCEDURE — 97112 NEUROMUSCULAR REEDUCATION: CPT | Performed by: PHYSICAL THERAPIST

## 2022-01-24 PROCEDURE — 97530 THERAPEUTIC ACTIVITIES: CPT | Performed by: PHYSICAL THERAPIST

## 2022-01-24 NOTE — PROGRESS NOTES
Daily Note   IE: 21  POC: 2/15/22  PN: 21    Today's date: 2022  Patient name: Mai Gomez  : 1955  MRN: [id number]  Referring provider: NIC Greene  Dx:   Encounter Diagnosis     ICD-10-CM    1  Dizziness  R42               Insurance:  AMA/CMS Eval/ Re-eval POC expires Ema Malave #/ Referral # Total   Visits  Start date  Expiration date Extension  Visit limitation? PT only or  PT+OT? Co-Insurance   CMS (Medicare) 11/23/21 2/15/22  Not required NA NA NA NA BOMN PT No                                                                  Subjective: Patient reports to session with 2/10 dizziness/nausea      Objective: See treatment diary below    Manual  - Sinus release    NMR  - ambulation with HT: 6 laps x 50 ft  - ambulation w/ HN: 6 laps x 50 ft  - FTEC w/ HT/HN: 20x  - FWD/BWD ambulation: 5 laps x 50 ft    TA  - Patient education    Assessment: Patient tolerated treatment well  Overall, she reported reduced dizziness/nausea today of 2/10  Due to reported increased "head pressure" sinus release preformed, with patient reporting reduction in pain level  She is able to maintain balance independently with all foam exercises today, with minimal sway observed that is corrected with appropriate ankle strategy  Discussed with patient sources of dizziness and possible improvements in her symptoms  Pt will benefit from continued skilled therapy to decrease dizziness sx, promote adaptation of vestibular system, improve static and dynamic balance, and promote restoration of PLOF  Plan: Continue per plan of care              Outcome Measures Initial Eval   PN  21 PN  22      mCTSIB  - FTEO (firm)  - FTEC (firm)  - FTEO (foam)  - FTEC (foam)   Defer   30 sec  30 sec  30 sec  30 sec       DGI Defer        FGA Defer 25/30 28/30      10 meter Defer        [address] /[facility]  62/100

## 2022-01-25 NOTE — TELEPHONE ENCOUNTER
Patient states she is having vertigo issues at this time and will call back to schedule an appt   NFA   Silvina Garrido MA

## 2022-01-27 ENCOUNTER — OFFICE VISIT (OUTPATIENT)
Dept: PHYSICAL THERAPY | Facility: CLINIC | Age: 67
End: 2022-01-27
Payer: MEDICARE

## 2022-01-27 DIAGNOSIS — R42 DIZZINESS: Primary | ICD-10-CM

## 2022-01-27 PROCEDURE — 97140 MANUAL THERAPY 1/> REGIONS: CPT | Performed by: PHYSICAL THERAPIST

## 2022-01-27 PROCEDURE — 97112 NEUROMUSCULAR REEDUCATION: CPT | Performed by: PHYSICAL THERAPIST

## 2022-01-27 NOTE — PROGRESS NOTES
Daily Note   IE: 21  POC: 2/15/22  PN: 21    Today's date: 2022  Patient name: Christos Acevedo  : 1955  MRN: [id number]  Referring provider: NIC Rutledge  Dx:   Encounter Diagnosis     ICD-10-CM    1  Dizziness  R42               Insurance:  AMA/CMS Eval/ Re-eval POC expires Khang Nielsen #/ Referral # Total   Visits  Start date  Expiration date Extension  Visit limitation? PT only or  PT+OT? Co-Insurance   CMS (Medicare) 11/23/21 2/15/22  Not required NA NA NA NA BOMN PT No                                                                  Subjective: Patient reports to session stating she is doing pretty well today      Objective: See treatment diary below    Manual  - Sinus release    NMR  - ambulation with HT: 6 laps x 50 ft  - ambulation w/ HN: 6 laps x 50 ft  - ambulation with diagonal HT: 6 laps x 50 ft  - FWD/BWD ambulation w/ ball toss: 5 laps x 30 ft      Assessment: Patient tolerated treatment well  She states that she was having a good day today, reports improvements after sinus release from previous session, performed again today  Continued with dynamic balance exercises, adding diagonal head turns today with patient reporting minimal increases in dizziness/unsteadiness  Also able to add FWD/BWD ball toss, completing without PT assist  Pt will benefit from continued skilled therapy to decrease dizziness sx, promote adaptation of vestibular system, improve static and dynamic balance, and promote restoration of PLOF  Plan: Continue per plan of care              Outcome Measures Initial Eval   PN  21 PN  22      mCTSIB  - FTEO (firm)  - FTEC (firm)  - FTEO (foam)  - FTEC (foam)   Defer   30 sec  30 sec  30 sec  30 sec       DGI Defer        FGA Defer 25/30 28/30      10 meter Defer        [address] /[facility]  62/100

## 2022-01-31 ENCOUNTER — OFFICE VISIT (OUTPATIENT)
Dept: PHYSICAL THERAPY | Facility: CLINIC | Age: 67
End: 2022-01-31
Payer: MEDICARE

## 2022-01-31 DIAGNOSIS — R42 DIZZINESS: Primary | ICD-10-CM

## 2022-01-31 PROCEDURE — 97530 THERAPEUTIC ACTIVITIES: CPT | Performed by: PHYSICAL THERAPIST

## 2022-01-31 PROCEDURE — 97112 NEUROMUSCULAR REEDUCATION: CPT | Performed by: PHYSICAL THERAPIST

## 2022-01-31 NOTE — PROGRESS NOTES
Daily Note   IE: 21  POC: 2/15/22  PN: 21    Today's date: 2022  Patient name: Neetu Watson  : 1955  MRN: [id number]  Referring provider: NIC Ivan  Dx:   Encounter Diagnosis     ICD-10-CM    1  Dizziness  R42               Insurance:  AMA/CMS Eval/ Re-eval POC expires Jennifer Morenoant #/ Referral # Total   Visits  Start date  Expiration date Extension  Visit limitation? PT only or  PT+OT? Co-Insurance   CMS (Medicare) 11/23/21 2/15/22  Not required NA NA NA NA BOMN PT No                                                                  Subjective: Patient reports to session stating she is feeling increased pressure in her nose, rates her dizziness today 2-3/10      Objective: See treatment diary below    TA  - Patient education     NMR  - ambulation with HT: 6 laps x 50 ft  - ambulation w/ HN: 6 laps x 50 ft  - ambulation with diagonal HT: 6 laps x 50 ft  - Sidestepping w/ ball toss: 3 laps x 50 ft      Assessment: Patient tolerated treatment well  Discussed with patient sources of dizziness and strategies for dizziness when in the community  She reports having increased HA pain after previous session, will hold sinus release today  Overall patient reported minor increases in dizziness to 3-4/10 during dynamic balance exercises that reduces with standing rest breaks  Pt will benefit from continued skilled therapy to decrease dizziness sx, promote adaptation of vestibular system, improve static and dynamic balance, and promote restoration of PLOF  Plan: Continue per plan of care              Outcome Measures Initial Eval   PN  21 PN  22      mCTSIB  - FTEO (firm)  - FTEC (firm)  - FTEO (foam)  - FTEC (foam)   Defer   30 sec  30 sec  30 sec  30 sec       DGI Defer        FGA Defer 25/30 28/30      10 meter Defer        [address] /[facility] [place]  62/100

## 2022-02-03 ENCOUNTER — APPOINTMENT (OUTPATIENT)
Dept: PHYSICAL THERAPY | Facility: CLINIC | Age: 67
End: 2022-02-03
Payer: MEDICARE

## 2022-02-07 ENCOUNTER — OFFICE VISIT (OUTPATIENT)
Dept: PHYSICAL THERAPY | Facility: CLINIC | Age: 67
End: 2022-02-07
Payer: MEDICARE

## 2022-02-07 DIAGNOSIS — R42 DIZZINESS: Primary | ICD-10-CM

## 2022-02-07 PROCEDURE — 97112 NEUROMUSCULAR REEDUCATION: CPT | Performed by: PHYSICAL THERAPIST

## 2022-02-07 PROCEDURE — 97530 THERAPEUTIC ACTIVITIES: CPT | Performed by: PHYSICAL THERAPIST

## 2022-02-07 NOTE — PROGRESS NOTES
Daily Note   IE: 21  POC: 2/15/22  PN: 21    Today's date: 2022  Patient name: Alicia Mcmanus  : 1955  MRN: [id number]  Referring provider: NIC Taylor  Dx:   Encounter Diagnosis     ICD-10-CM    1  Dizziness  R42               Insurance:  AMA/CMS Eval/ Re-eval POC expires Gillian Sweet #/ Referral # Total   Visits  Start date  Expiration date Extension  Visit limitation? PT only or  PT+OT? Co-Insurance   CMS (Medicare) 11/23/21 2/15/22  Not required NA NA NA NA BOMN PT No                                                                  Subjective: Patient reports she was having a bad morning, rates her dizziness today 3/10      Objective: See treatment diary below    TA  - Patient education, HEP    NMR  - Head Impulse Test: negative (-) B/L  - VOR x1: H + V, 1 min, to tolerance  - ambulation with HT: 6 laps x 50 ft  - ambulation w/ HN: 6 laps x 50 ft  - ambulation with diagonal HT: 6 laps x 50 ft      Assessment: Patient tolerated treatment well  Discussed with patient continuing HEP and updating to add dynamic balance exercises and increasing VOR exercises to 1-2 minute time duration  Head impulse test performed today with negative results bilaterally  Re-added VOR x1 exercise to increase exercise duration to head turn frequency to move as fast as patient can tolerate  VOR x1 increased dizziness up to 5/10 but reduced with standing rest break  Pt will benefit from continued skilled therapy to decrease dizziness sx, promote adaptation of vestibular system, improve static and dynamic balance, and promote restoration of PLOF  Plan: Continue per plan of care              Outcome Measures Initial Eval   PN  21 PN  22      mCTSIB  - FTEO (firm)  - FTEC (firm)  - FTEO (foam)  - FTEC (foam)   Defer   30 sec  30 sec  30 sec  30 sec       DGI Defer        FGA Defer 25/30 28/30      10 meter Defer        [facility] [address] /[facility]  62/100

## 2022-02-14 ENCOUNTER — OFFICE VISIT (OUTPATIENT)
Dept: PHYSICAL THERAPY | Facility: CLINIC | Age: 67
End: 2022-02-14
Payer: MEDICARE

## 2022-02-14 DIAGNOSIS — R42 DIZZINESS: Primary | ICD-10-CM

## 2022-02-14 PROCEDURE — 97530 THERAPEUTIC ACTIVITIES: CPT | Performed by: PHYSICAL THERAPIST

## 2022-02-14 PROCEDURE — 97112 NEUROMUSCULAR REEDUCATION: CPT | Performed by: PHYSICAL THERAPIST

## 2022-02-14 NOTE — PROGRESS NOTES
Daily Note   IE: 21  POC: 2/15/22  PN: 21    Today's date: 2022  Patient name: Josue Perea  : 1955  MRN: [id number]  Referring provider: NIC Bloom  Dx:   Encounter Diagnosis     ICD-10-CM    1  Dizziness  R42               Insurance:  AMA/CMS Eval/ Re-eval POC expires Pitopedro Orozcoan #/ Referral # Total   Visits  Start date  Expiration date Extension  Visit limitation? PT only or  PT+OT? Co-Insurance   CMS (Medicare) 11/23/21 2/15/22  Not required NA NA NA NA BOMN PT No                                                                  Subjective: Patient brought in her current homeopathic medications Pycnogenol and "NutriBiotic Sinus" nasal spray plus  She currently rates her dizziness at 4/10, had earache/tinnitus/HA when she woke up this morning      Objective: See treatment diary below    TA  - Patient education, HEP    NMR  - Kennedy the drain: 1 lap x 3 CW/CCW, 4-5/10 dizziness  - VOR x1: H + V, 1 min, to tolerance, 2-5/10 dizziness  - ambulation with HT: 6 laps x 50 ft  - ambulation w/ HN: 6 laps x 50 ft  - ambulation with diagonal HT: 6 laps x 50 ft      Assessment: Patient tolerated treatment well  Kennedy the drain added today, with patient reporting increased dizziness up to 5/10 when doing >1 lap at a time  With increased dizziness, patient was able to self manage with standing rest break  Discussed with patient appropriate HEP dosage, VOR performance/repetitions, and upcoming schedule  She continues to have minor symptoms ambulation HT/HN/diagonals, with only minor veering observed and 1 LOB that she was able to correct with stepping strategy  Pt will benefit from continued skilled therapy to decrease dizziness sx, promote adaptation of vestibular system, improve static and dynamic balance, and promote restoration of PLOF  Plan: Continue per plan of care              Outcome Measures Initial Eval   PN  21 PN  22      mCTSIB  - FTEO (firm)  - FTEC (firm)  - FTEO (foam)  - FTEC (foam)   Defer   30 sec  30 sec  30 sec  30 sec       DGI Defer        FGA Defer 25/30 28/30      10 meter Defer        [address] /[facility]  62/100

## 2022-02-15 ENCOUNTER — TELEPHONE (OUTPATIENT)
Dept: FAMILY MEDICINE CLINIC | Facility: CLINIC | Age: 67
End: 2022-02-15

## 2022-02-15 NOTE — TELEPHONE ENCOUNTER
Patient is currently being treated for vertigo  She is having some testing done and doing PT  She states she will call in the near future to schedule awv Rebbeca N[place], MA

## 2022-02-17 ENCOUNTER — OFFICE VISIT (OUTPATIENT)
Dept: AUDIOLOGY | Facility: CLINIC | Age: 67
End: 2022-02-17
Payer: MEDICARE

## 2022-02-17 DIAGNOSIS — R42 DIZZINESS: Primary | ICD-10-CM

## 2022-02-17 PROCEDURE — 92540 BASIC VESTIBULAR EVALUATION: CPT | Performed by: AUDIOLOGIST

## 2022-02-17 PROCEDURE — 92538 CALORIC VSTBLR TEST W/REC: CPT | Performed by: AUDIOLOGIST

## 2022-02-21 ENCOUNTER — APPOINTMENT (OUTPATIENT)
Dept: PHYSICAL THERAPY | Facility: CLINIC | Age: 67
End: 2022-02-21
Payer: MEDICARE
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
## 2022-02-22 ENCOUNTER — OFFICE VISIT (OUTPATIENT)
Dept: OTOLARYNGOLOGY | Facility: CLINIC | Age: 67
End: 2022-02-22
Payer: MEDICARE

## 2022-02-22 DIAGNOSIS — M54.2 NECK PAIN: ICD-10-CM

## 2022-02-22 DIAGNOSIS — G89.29 CHRONIC NONINTRACTABLE HEADACHE, UNSPECIFIED HEADACHE TYPE: ICD-10-CM

## 2022-02-22 DIAGNOSIS — E74.89 OTHER SPECIFIED DISORDERS OF CARBOHYDRATE METABOLISM (HCC): ICD-10-CM

## 2022-02-22 DIAGNOSIS — R42 VERTIGO: Primary | ICD-10-CM

## 2022-02-22 DIAGNOSIS — R51.9 CHRONIC NONINTRACTABLE HEADACHE, UNSPECIFIED HEADACHE TYPE: ICD-10-CM

## 2022-02-22 DIAGNOSIS — M26.609 TMJ (TEMPOROMANDIBULAR JOINT SYNDROME): ICD-10-CM

## 2022-02-22 DIAGNOSIS — H93.8X2 SENSATION OF FULLNESS IN LEFT EAR: ICD-10-CM

## 2022-02-22 PROCEDURE — 99214 OFFICE O/P EST MOD 30 MIN: CPT | Performed by: NURSE PRACTITIONER

## 2022-02-22 RX ORDER — AMITRIPTYLINE HYDROCHLORIDE 10 MG/1
10 TABLET, FILM COATED ORAL
Qty: 30 TABLET | Refills: 2 | Status: SHIPPED | OUTPATIENT
Start: 2022-02-22 | End: 2022-03-24

## 2022-02-22 NOTE — PATIENT INSTRUCTIONS
TMJ syndrome - soft diet, jaw rest, NSAIDs, warm compresses, massage, oral appliance, PT, or consultation with dentist  Follow up with Dr Jaron Gloria to discuss long term medications

## 2022-02-22 NOTE — PROGRESS NOTES
Assessment/Plan:    Vertigo   Since last visit She has had some mild improvement in vertigo with PT  Tinnitus, crackling in ears, and sensation fullness in ears comes and goes  Recent VNG results reviewed  Prior audiogram with normal bilateral hearing  No otologic source identified  Discussed with patient in great detail vestibular migraine possibility along with anxiety impact  Discussed impact of anxiety on vertigo due to longevity and VNG results  Reviewed otalgia, ear popping, and neck discomfort  TMJ syndrome - soft diet, jaw rest, NSAIDs, warm compresses, massage, oral appliance, PT, or consultation with dentist   Recommend adding in neck and TMJ PT to sessions  Continue Flonase (fluticasone) nasal spray for ETD and nasal congestion  Discussed medications for vestibular migraine vs anxiety  Offered Trial amitriptylline  Will begin medication therapy for anxiety/vestibular migraine but will eventually defer medication management to PCP  Try start with tricyclics  If no improvement then consider SSRI's (sertraline, celexa etc) or as per PCP    Encouraged proceed with MRI brain with IAC to rule out worrisome processes  May take valium prior to procedure  Given 5 valium tablets  If unable complete MRI then consider anesthesia for MRI  Recommend she follow up with PCP to discuss anxiety related concerns  May benefit from cognitive behavioral therapy as well as medications to address anxiety and depression  May also consider acupuncture  Follow up in 4 to 6 weeks         Diagnoses and all orders for this visit:    Vertigo  -     amitriptyline (ELAVIL) 10 mg tablet; Take 1 tablet (10 mg total) by mouth daily at bedtime    Sensation of fullness in left ear  -     amitriptyline (ELAVIL) 10 mg tablet; Take 1 tablet (10 mg total) by mouth daily at bedtime    TMJ (temporomandibular joint syndrome)  -     Ambulatory Referral to Physical Therapy;  Future  -     amitriptyline (ELAVIL) 10 mg tablet; Take 1 tablet (10 mg total) by mouth daily at bedtime    Neck pain  -     Ambulatory Referral to Physical Therapy; Future  -     amitriptyline (ELAVIL) 10 mg tablet; Take 1 tablet (10 mg total) by mouth daily at bedtime    Other specified disorders of carbohydrate metabolism (HCC)  -     amitriptyline (ELAVIL) 10 mg tablet; Take 1 tablet (10 mg total) by mouth daily at bedtime    Chronic nonintractable headache, unspecified headache type          Subjective:      Patient ID: Chon Trevizo is a 77 y o  female  Presents today as a follow up due to ear discomfort with vertigo  Began over 4 months ago  Senastion in fog  Left worse than right  When initially started had sharp scalp discomfort  Vertigo with start as well with dizziness, imbalance, sensation falling  Treated with oral antibiotics and dramamine  Since then feeling shaky, dizzy but not vertigo  Noted during a recent therapy session for anxiety was performing rapid eye movements and next day vertigo started  Since last visit dizziness episodes come and go  Continues with PT and feeling some improvement  Recent VNG testing and felt worse 3 to 5 days after testing  Continues with Crackling in ears        The following portions of the patient's history were reviewed and updated as appropriate: allergies, current medications, past family history, past medical history, past social history, past surgical history and problem list     Review of Systems   Constitutional: Positive for fatigue  HENT: Positive for ear pain  Negative for congestion, ear discharge, hearing loss, nosebleeds, postnasal drip, rhinorrhea, sinus pressure, sinus pain, sore throat, tinnitus and voice change  Eyes: Negative  Respiratory: Negative for chest tightness and shortness of breath  Cardiovascular: Negative  Gastrointestinal: Negative  Endocrine: Negative  Musculoskeletal: Positive for neck pain  Skin: Negative for color change  Neurological: Positive for dizziness and headaches  Negative for numbness  Psychiatric/Behavioral: Positive for sleep disturbance  The patient is nervous/anxious  Objective: There were no vitals taken for this visit  Physical Exam  Constitutional:       Appearance: She is well-developed  HENT:      Head: Normocephalic  Jaw: Tenderness and malocclusion present  Right Ear: Hearing, tympanic membrane, ear canal and external ear normal  No decreased hearing noted  No drainage or tenderness  Tympanic membrane is not perforated or erythematous  Left Ear: Hearing, tympanic membrane, ear canal and external ear normal  No decreased hearing noted  No drainage or tenderness  Tympanic membrane is not perforated or erythematous  Nose: Nose normal  No nasal deformity or septal deviation  Mouth/Throat:      Mouth: Mucous membranes are not pale and not dry  No oral lesions  Dentition: Normal dentition  Pharynx: Uvula midline  No oropharyngeal exudate  Neck:      Trachea: No tracheal deviation  Cardiovascular:      Rate and Rhythm: Normal rate  Pulmonary:      Effort: Pulmonary effort is normal  No accessory muscle usage or respiratory distress  Musculoskeletal:      Right shoulder: Normal range of motion  Cervical back: Full passive range of motion without pain, normal range of motion and neck supple  Lymphadenopathy:      Cervical: No cervical adenopathy  Skin:     General: Skin is warm and dry  Neurological:      Mental Status: She is alert and oriented to person, place, and time  Cranial Nerves: No cranial nerve deficit  Sensory: No sensory deficit  Psychiatric:         Behavior: Behavior is cooperative

## 2022-02-22 NOTE — ASSESSMENT & PLAN NOTE
Since last visit She has had some mild improvement in vertigo with PT  Tinnitus, crackling in ears, and sensation fullness in ears comes and goes  Recent VNG results reviewed  Prior audiogram with normal bilateral hearing  No otologic source identified  Discussed with patient in great detail vestibular migraine possibility along with anxiety impact  Discussed impact of anxiety on vertigo due to longevity and VNG results  Reviewed otalgia, ear popping, and neck discomfort  TMJ syndrome - soft diet, jaw rest, NSAIDs, warm compresses, massage, oral appliance, PT, or consultation with dentist   Recommend adding in neck and TMJ PT to sessions  Continue Flonase (fluticasone) nasal spray for ETD and nasal congestion  Discussed medications for vestibular migraine vs anxiety  Offered Trial amitriptylline  Will begin medication therapy for anxiety/vestibular migraine but will eventually defer medication management to PCP  Try start with tricyclics  If no improvement then consider SSRI's (sertraline, celexa etc) or as per PCP    Encouraged proceed with MRI brain with IAC to rule out worrisome processes  May take valium prior to procedure  Given 5 valium tablets  If unable complete MRI then consider anesthesia for MRI  Recommend she follow up with PCP to discuss anxiety related concerns  May benefit from cognitive behavioral therapy as well as medications to address anxiety and depression  May also consider acupuncture       Follow up in 4 to 6 weeks

## 2022-02-28 ENCOUNTER — OFFICE VISIT (OUTPATIENT)
Dept: PHYSICAL THERAPY | Facility: CLINIC | Age: 67
End: 2022-02-28
Payer: MEDICARE

## 2022-02-28 DIAGNOSIS — R42 VERTIGO: ICD-10-CM

## 2022-02-28 DIAGNOSIS — R51.9 CHRONIC NONINTRACTABLE HEADACHE, UNSPECIFIED HEADACHE TYPE: ICD-10-CM

## 2022-02-28 DIAGNOSIS — R42 DIZZINESS: Primary | ICD-10-CM

## 2022-02-28 DIAGNOSIS — G89.29 CHRONIC NONINTRACTABLE HEADACHE, UNSPECIFIED HEADACHE TYPE: ICD-10-CM

## 2022-02-28 DIAGNOSIS — R42 VERTIGO: Primary | ICD-10-CM

## 2022-02-28 PROCEDURE — 97164 PT RE-EVAL EST PLAN CARE: CPT | Performed by: PHYSICAL THERAPIST

## 2022-02-28 RX ORDER — MECLIZINE HYDROCHLORIDE 25 MG/1
25 TABLET ORAL EVERY 8 HOURS PRN
Qty: 30 TABLET | Refills: 0 | Status: SHIPPED | OUTPATIENT
Start: 2022-02-28 | End: 2022-03-24

## 2022-02-28 NOTE — PROGRESS NOTES
PT Re-Evaluation   Today's date: 2022  Patient name: Harry Ty  : 1955  MRN: [id number]  Referring provider: NIC Rendon  Dx:   Encounter Diagnosis     ICD-10-CM    1  Dizziness  R42          Assessment  Assessment details: Patient is a 77 y o  Female who presents to skilled outpatient PT with dizziness that varies throughout the day  She continues to report dizziness, increased pressure in her sinuses, and occasional ringing in her ears  Patient reports a reduced score in her [address], however she remains at moderate perception of handicap  With oculomotor screen, patient reports increased dizziness and headache pain with smooth pursuits, and increased dizziness with VOR x1 in horizontal and vertical directions  Throughout re-evaluation, discussed with patient multiple sources of dizziness and deep breathing techniques  Plan to add MSQ during next session  Patient will benefit from skilled PT services to reduce her dizziness and help her return to her PLOF  Patient verbalized understanding of POC  Please contact me if you have any questions or recommendations  Thank you for the referral and the opportunity to share in [employer] care        Cut off score   All date taken from [facility] or Rehab Measures    DGI:  MDC for Vestibular Disorders: 4 points  S[facility] Ol[facility] [address] for Geriatrics/Community Dwelling Older Adults: 3 Points  Falls risk cut off: <19/24    FGA:  MCID: 4 points  Geriatrics/Community Dwelling Older Adults: </= 22/30 fall risk  Geriatrics/Community Dwelling Older Adults: </= 20/30 unexplained falls in the next 6 months  Parkinsons: </= 18/30 fall risk    mCTSIB (normed on ages 19-56, lower number is less sway or better static balance)  Eyes open firm surface (norm 0 21-0 48)  Eyes closed firm surface (norm 0 48-0 99)  Eyes open foam surface (norm 0 38-0 71)  Eyes closed foam surface (norm 0  70-2 22)    DHI:  0-39: low perception of handicap  40-69: moderate perception of handicap  : severe perception of handicap  > 60: increased risk for falls        Impairments: activity intolerance, impaired balance, lacks appropriate, HEP, safety issue  Understanding of Dx/Px/POC: Good  Prognosis: Good      Goals      Vestibular Short Term Goals:  - Patient will display improved cervical spine STM by 50% to encourage improved AROM during functional tasks  - Patient will be independent with simple HEP- MET  - Patient will tolerate 60 seconds of oculomotor exercises with minimal increase in symptoms- NOT MET  - Patient will be able to tolerate 30 seconds with eyes closed on foam surface without any loss of balance demonstrating improvement in vestibular system- MET  - Patient will improve FGA score by 4 points per MDC to promote improved safety with dynamic tasks    Vestibular Long Term Goals:  - Patient will display decreased forward head and rounded shoulders to promote improved resting posture and cervical mobility  - Patient will be independent with complex HEP  - Patient will tolerate >=2 minutes of oculomotor exercises to facilitate return to reading and computer work  - Patient will demonstrate ability to perform HT in gait without veering  - Patient will score low risk for falls with FGA test with score of 23/30 or higher per current research data  - Patient will report baseline dizziness of 1/10 or less   - Patient will report 2/10 dizziness or less with visual stimulating surround with duration of 2 minutes   - Patient will report subjective improvement to 90% or higher to promote return to PLOF          Plan  Plan details: re-test positionals  Patient would benefit from: PT Eval  Planned therapy interventions: balance, HEP, manual therapy, neuromuscular re-education, patient education  Frequency: 1-3x per week  Duration in weeks: 12  Plan of Care beginning date: 2/15/2021  Plan of Care expiration date: 12 weeks - 5/10/2022  Treatment plan discussed with: Patient        Subjective Evaluation    History of Present Illness  Mechanism of injury: Patient reports that she has had dizziness for the last month that has been positional, states she is nervous about when it is going to happen next  States it happens pretty much everyday but is better when she is at home    Update (21): Patient reports to therapy stating she had a good weekend, but woke up this morning with ear pain that she describes as pressure  She currently rates her dizziness at 3-4/10, states it has not got worse over the last month  Is currently reporting light ringing in her ears and also notes she finished her antibiotics on Saturday  Update (22): Patient states that she is not feeling well today, reporting increased dizziness, increased "head pressure", and nauseous  She reports that her symptoms "come and go" with some days being better than others  She is scheduled for MRI in February  Update (22): Patient reports that she is not feeling well, has been feeling worse since her VNG   She states that her dizziness comes and goes, but is often interfering with her ability to perform tasks around her house    Dizziness Subjective  How long does dizziness last: 30 minutes  How would you describe the dizziness: in a fog, cant concentrate  Rolling in bed: No  Supine to/from sit: Yes       Pain  Current pain ratin/10      Treatments  Previous treatment: PT,         Objective     Vitals  - BP: 138/88  - HR: 108 bpm  - SpO2: 97%    BPPV Objective  Integrity Testing  - mVBI: Normal  - Sharp Gabriel: Normal  - Alar Ligament Stability Test: Normal  - Palpation: hypertonic L UT, LS, Suboccipitals       Oculomotor Screen   Dizziness 0 / 10     -Smooth Pursuits- Central   Abnormal, Dizziness/HA 2/10    -Gaze holding nystagmus-   Normal, Dizziness 0/10    -Spontaneous nystagmus room light-  Normal, Dizziness 0/10    -Near [place] 1 Convergence- Central   Normal, 4 /inches ( 4 inch/ 6CM norm)   Dizziness 0/10    -Saccades- Central   Horizontal   Normal, Dizziness 0/10  Vertical   Normal, Dizziness 0/10    -VOR Screen / Motion Sensitivity-   Horizontal   Normal, Dizziness 2/10  Vertical   Normal, Dizziness 2/10      -VOR Cancel- Central   Horizontal   Abnormal, Dizziness 0/10  Vertical   Normal, Dizziness 0/10            Outcome Measures Initial Eval   PN  12/20/21 PN  1/20/22 RE  2/28/22     mCTSIB  - FTEO (firm)  - FTEC (firm)  - FTEO (foam)  - FTEC (foam)   Defer   30 sec  30 sec  30 sec  30 sec       DGI Defer        FGA Defer 25/30 28/30      10 meter Defer        DHI /100  62/100 50/100                                                       Precautions: No past medical history on file

## 2022-03-14 DIAGNOSIS — R42 VERTIGO: ICD-10-CM

## 2022-03-14 RX ORDER — DIAZEPAM 2 MG/1
2 TABLET ORAL EVERY 6 HOURS PRN
Qty: 20 TABLET | Refills: 0 | Status: SHIPPED | OUTPATIENT
Start: 2022-03-14 | End: 2022-03-16 | Stop reason: ALTCHOICE

## 2022-03-15 ENCOUNTER — TELEPHONE (OUTPATIENT)
Dept: NEUROLOGY | Facility: CLINIC | Age: 67
End: 2022-03-15

## 2022-03-16 ENCOUNTER — CONSULT (OUTPATIENT)
Dept: NEUROLOGY | Facility: CLINIC | Age: 67
End: 2022-03-16
Payer: MEDICARE

## 2022-03-16 VITALS
HEART RATE: 130 BPM | SYSTOLIC BLOOD PRESSURE: 152 MMHG | DIASTOLIC BLOOD PRESSURE: 88 MMHG | WEIGHT: 140 LBS | HEIGHT: 63 IN | TEMPERATURE: 97.3 F | BODY MASS INDEX: 24.8 KG/M2

## 2022-03-16 DIAGNOSIS — F40.01 PANIC DISORDER WITH AGORAPHOBIA: Primary | ICD-10-CM

## 2022-03-16 DIAGNOSIS — R51.9 CHRONIC NONINTRACTABLE HEADACHE, UNSPECIFIED HEADACHE TYPE: ICD-10-CM

## 2022-03-16 DIAGNOSIS — R29.898 LEG WEAKNESS, BILATERAL: ICD-10-CM

## 2022-03-16 DIAGNOSIS — F41.1 GENERALIZED ANXIETY DISORDER: ICD-10-CM

## 2022-03-16 DIAGNOSIS — G89.29 CHRONIC NONINTRACTABLE HEADACHE, UNSPECIFIED HEADACHE TYPE: ICD-10-CM

## 2022-03-16 PROCEDURE — 99204 OFFICE O/P NEW MOD 45 MIN: CPT | Performed by: PSYCHIATRY & NEUROLOGY

## 2022-03-16 RX ORDER — ALPRAZOLAM 0.5 MG/1
0.5 TABLET ORAL ONCE AS NEEDED
Qty: 20 TABLET | Refills: 0 | Status: SHIPPED | OUTPATIENT
Start: 2022-03-16 | End: 2022-03-25 | Stop reason: SDUPTHER

## 2022-03-16 RX ORDER — LANOLIN ALCOHOL/MO/W.PET/CERES
1 CREAM (GRAM) TOPICAL 2 TIMES DAILY
COMMUNITY
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
## 2022-03-24 ENCOUNTER — OFFICE VISIT (OUTPATIENT)
Dept: FAMILY MEDICINE CLINIC | Facility: CLINIC | Age: 67
End: 2022-03-24
Payer: MEDICARE

## 2022-03-24 VITALS
WEIGHT: 141 LBS | BODY MASS INDEX: 24.98 KG/M2 | TEMPERATURE: 98.9 F | SYSTOLIC BLOOD PRESSURE: 144 MMHG | HEART RATE: 96 BPM | DIASTOLIC BLOOD PRESSURE: 70 MMHG | HEIGHT: 63 IN | RESPIRATION RATE: 20 BRPM

## 2022-03-24 DIAGNOSIS — Z00.00 MEDICARE ANNUAL WELLNESS VISIT, SUBSEQUENT: Primary | ICD-10-CM

## 2022-03-24 DIAGNOSIS — F33.9 DEPRESSION, RECURRENT (HCC): ICD-10-CM

## 2022-03-24 DIAGNOSIS — F40.01 PANIC DISORDER WITH AGORAPHOBIA: ICD-10-CM

## 2022-03-24 DIAGNOSIS — F41.1 GENERALIZED ANXIETY DISORDER: ICD-10-CM

## 2022-03-24 PROBLEM — M54.2 NECK PAIN: Status: RESOLVED | Noted: 2022-02-22 | Resolved: 2022-03-24

## 2022-03-24 PROBLEM — E74.89 OTHER SPECIFIED DISORDERS OF CARBOHYDRATE METABOLISM (HCC): Status: RESOLVED | Noted: 2022-02-22 | Resolved: 2022-03-24

## 2022-03-24 PROBLEM — H93.8X2 SENSATION OF FULLNESS IN LEFT EAR: Status: RESOLVED | Noted: 2021-11-23 | Resolved: 2022-03-24

## 2022-03-24 PROCEDURE — 1123F ACP DISCUSS/DSCN MKR DOCD: CPT | Performed by: INTERNAL MEDICINE

## 2022-03-24 PROCEDURE — G0438 PPPS, INITIAL VISIT: HCPCS | Performed by: INTERNAL MEDICINE

## 2022-03-24 PROCEDURE — 99213 OFFICE O/P EST LOW 20 MIN: CPT | Performed by: INTERNAL MEDICINE

## 2022-03-24 RX ORDER — ONDANSETRON HYDROCHLORIDE 8 MG/1
8 TABLET, FILM COATED ORAL EVERY 8 HOURS PRN
Qty: 20 TABLET | Refills: 0 | Status: SHIPPED | OUTPATIENT
Start: 2022-03-24 | End: 2022-04-14

## 2022-03-24 RX ORDER — ESCITALOPRAM OXALATE 5 MG/1
5 TABLET ORAL DAILY
Qty: 30 TABLET | Refills: 1 | Status: SHIPPED | OUTPATIENT
Start: 2022-03-24 | End: 2022-04-14 | Stop reason: SDUPTHER

## 2022-03-24 NOTE — PATIENT INSTRUCTIONS
Medicare Preventive Visit Patient Instructions  Thank you for completing your Welcome to Medicare Visit or Medicare Annual Wellness Visit today  Your next wellness visit will be due in one year (3/25/2023)  The screening/preventive services that you may require over the next 5-10 years are detailed below  Some tests may not apply to you based off risk factors and/or age  Screening tests ordered at today's visit but not completed yet may show as past due  Also, please note that scanned in results may not display below  Preventive Screenings:  Service Recommendations Previous Testing/Comments   Colorectal Cancer Screening  * Colonoscopy    * Fecal Occult Blood Test (FOBT)/Fecal Immunochemical Test (FIT)  * Fecal DNA/Cologuard Test  * Flexible Sigmoidoscopy Age: 54-65 years old   Colonoscopy: every 10 years (may be performed more frequently if at higher risk)  OR  FOBT/FIT: every 1 year  OR  Cologuard: every 3 years  OR  Sigmoidoscopy: every 5 years  Screening may be recommended earlier than age 48 if at higher risk for colorectal cancer  Also, an individualized decision between you and your healthcare provider will decide whether screening between the ages of 74-80 would be appropriate  Colonoscopy: Not on file  FOBT/FIT: Not on file  Cologuard: Not on file  Sigmoidoscopy: Not on file          Breast Cancer Screening Age: 36 years old  Frequency: every 1-2 years  Not required if history of left and right mastectomy Mammogram: 11/02/2021    Screening Current   Cervical Cancer Screening Between the ages of 21-29, pap smear recommended once every 3 years  Between the ages of 33-67, can perform pap smear with HPV co-testing every 5 years     Recommendations may differ for women with a history of total hysterectomy, cervical cancer, or abnormal pap smears in past  Pap Smear: Not on file    Screening Not Indicated   Hepatitis C Screening Once for adults born between 1945 and 1965  More frequently in patients at high risk for Hepatitis C Hep C Antibody: 10/12/2019    Screening Current   Diabetes Screening 1-2 times per year if you're at risk for diabetes or have pre-diabetes Fasting glucose: 112 mg/dL   A1C: 5 5 %    Screening Current   Cholesterol Screening Once every 5 years if you don't have a lipid disorder  May order more often based on risk factors  Lipid panel: 10/12/2019    Screening Not Indicated  History Lipid Disorder     Other Preventive Screenings Covered by Medicare:  1  Abdominal Aortic Aneurysm (AAA) Screening: covered once if your at risk  You're considered to be at risk if you have a family history of AAA  2  Lung Cancer Screening: covers low dose CT scan once per year if you meet all of the following conditions: (1) Age 50-69; (2) No signs or symptoms of lung cancer; (3) Current smoker or have quit smoking within the last 15 years; (4) You have a tobacco smoking history of at least 30 pack years (packs per day multiplied by number of years you smoked); (5) You get a written order from a healthcare provider  3  Glaucoma Screening: covered annually if you're considered high risk: (1) You have diabetes OR (2) Family history of glaucoma OR (3)  aged 48 and older OR (3)  American aged 72 and older  3  Osteoporosis Screening: covered every 2 years if you meet one of the following conditions: (1) You're estrogen deficient and at risk for osteoporosis based off medical history and other findings; (2) Have a vertebral abnormality; (3) On glucocorticoid therapy for more than 3 months; (4) Have primary hyperparathyroidism; (5) On osteoporosis medications and need to assess response to drug therapy  · Last bone density test (DXA Scan): 07/21/2020   5  HIV Screening: covered annually if you're between the age of 15-65  Also covered annually if you are younger than 13 and older than 72 with risk factors for HIV infection   For pregnant patients, it is covered up to 3 times per pregnancy  Immunizations:  Immunization Recommendations   Influenza Vaccine Annual influenza vaccination during flu season is recommended for all persons aged >= 6 months who do not have contraindications   Pneumococcal Vaccine (Prevnar and Pneumovax)  * Prevnar = PCV13  * Pneumovax = PPSV23   Adults 25-60 years old: 1-3 doses may be recommended based on certain risk factors  Adults 72 years old: Prevnar (PCV13) vaccine recommended followed by Pneumovax (PPSV23) vaccine  If already received PPSV23 since turning 65, then PCV13 recommended at least one year after PPSV23 dose  Hepatitis B Vaccine 3 dose series if at intermediate or high risk (ex: diabetes, end stage renal disease, liver disease)   Tetanus (Td) Vaccine - COST NOT COVERED BY MEDICARE PART B Following completion of primary series, a booster dose should be given every 10 years to maintain immunity against tetanus  Td may also be given as tetanus wound prophylaxis  Tdap Vaccine - COST NOT COVERED BY MEDICARE PART B Recommended at least once for all adults  For pregnant patients, recommended with each pregnancy  Shingles Vaccine (Shingrix) - COST NOT COVERED BY MEDICARE PART B  2 shot series recommended in those aged 48 and above     Health Maintenance Due:      Topic Date Due    Colorectal Cancer Screening  Never done    Breast Cancer Screening: Mammogram  11/02/2022    Hepatitis C Screening  Completed     Immunizations Due:      Topic Date Due    COVID-19 Vaccine (1) Never done    DTaP,Tdap,and Td Vaccines (1 - Tdap) Never done    Pneumococcal Vaccine: 65+ Years (1 of 1 - PPSV23) Never done    Influenza Vaccine (1) 09/01/2021     Advance Directives   What are advance directives? Advance directives are legal documents that state your wishes and plans for medical care  These plans are made ahead of time in case you lose your ability to make decisions for yourself   Advance directives can apply to any medical decision, such as the treatments you want, and if you want to donate organs  What are the types of advance directives? There are many types of advance directives, and each state has rules about how to use them  You may choose a combination of any of the following:  · Living will: This is a written record of the treatment you want  You can also choose which treatments you do not want, which to limit, and which to stop at a certain time  This includes surgery, medicine, IV fluid, and tube feedings  · Durable power of  for healthcare [facility]): This is a written record that states who you want to make healthcare choices for you when you are unable to make them for yourself  This person, called a proxy, is usually a family member or a friend  You may choose more than 1 proxy  · Do not resuscitate (DNR) order:  A DNR order is used in case your heart stops beating or you stop breathing  It is a request not to have certain forms of treatment, such as CPR  A DNR order may be included in other types of advance directives  · Medical directive: This covers the care that you want if you are in a coma, near death, or unable to make decisions for yourself  You can list the treatments you want for each condition  Treatment may include pain medicine, surgery, blood transfusions, dialysis, IV or tube feedings, and a ventilator (breathing machine)  · Values history: This document has questions about your views, beliefs, and how you feel and think about life  This information can help others choose the care that you would choose  Why are advance directives important? An advance directive helps you control your care  Although spoken wishes may be used, it is better to have your wishes written down  Spoken wishes can be misunderstood, or not followed  Treatments may be given even if you do not want them  An advance directive may make it easier for your family to make difficult choices about your care     Depression   Depression  is a medical condition that causes feelings of sadness or hopelessness that do not go away  Depression may cause you to lose interest in things you used to enjoy  These feelings may interfere with your daily life  Call your local emergency number (911 in the [address]) if:   · You think about harming yourself or someone else  · You have done something on purpose to hurt yourself  The following resources are available at any time to help you, if needed:   · [address] [facility]: 3-790.588.5794 (0-839-763-BJKO)   · Suicide Hotline: 6-413.372.8995 (9-888-TJQDISD)   · For a list of international numbers: https://save org/find-help/international-resources/  Treatment for depression may include medicine to relieve depression  Medicine is often used together with therapy  Therapy is a way for you to talk about your feelings and anything that may be causing depression  Therapy can be done alone or in a group  It may also be done with family members or a significant other  · Get regular physical activity  · Create a regular sleep schedule  · Eat a variety of healthy foods  · Do not drink alcohol or use drugs  © Copyright [employer] 2018 Information is for End User's use only and may not be sold, redistributed or otherwise used for commercial purposes   All illustrations and images included in CareNotes® are the copyrighted property of A D A M , Inc  or [facility] [employer]

## 2022-03-24 NOTE — PROGRESS NOTES
Assessment and Plan:     Problem List Items Addressed This Visit     None           Preventive health issues were discussed with patient, and age appropriate screening tests were ordered as noted in patient's After Visit Summary  Personalized health advice and appropriate referrals for health education or preventive services given if needed, as noted in patient's After Visit Summary  History of Present Illness:     Patient presents for Medicare Annual Wellness visit    Patient Care Team:  Kiki Foote MD as PCP - General  NIC Velasquez MD     Problem List:     Patient Active Problem List   Diagnosis    Anxiety disorder    Exophthalmos    Herpangina    Low back pain    Mixed hyperlipidemia    Vertigo    Sensation of fullness in left ear    Chronic rhinitis    Hypokalemia    Chronic headaches    TMJ (temporomandibular joint syndrome)    Neck pain    Other specified disorders of carbohydrate metabolism ([address] )      Past Medical and Surgical History:     No past medical history on file    Past Surgical History:   Procedure Laterality Date    HYSTERECTOMY      TONSILLECTOMY        Family History:     Family History   Problem Relation Age of Onset    Breast cancer Maternal Grandmother     Heart disease Mother     Hyperlipidemia Mother     Hypothyroidism Mother     No Known Problems Father     No Known Problems Sister     No Known Problems Daughter     No Known Problems Sister     No Known Problems Sister     No Known Problems Sister       Social History:     Social History     Socioeconomic History    Marital status: /Civil Union     Spouse name: Not on file    Number of children: Not on file    Years of education: Not on file    Highest education level: Not on file   Occupational History    Not on file   Tobacco Use    Smoking status: Never Smoker    Smokeless tobacco: Never Used   Vaping Use    Vaping Use: Never used   Substance and Sexual Activity  Alcohol use: Not Currently    Drug use: Never    Sexual activity: Not on file   Other Topics Concern    Not on file   Social History Narrative    Not on file     Social Determinants of Health     Financial Resource Strain: Not on file   Food Insecurity: Not on file   Transportation Needs: Not on file   Physical Activity: Not on file   Stress: Not on file   Social Connections: Not on file   Intimate Partner Violence: Not on file   Housing Stability: Not on file      Medications and Allergies:     Current Outpatient Medications   Medication Sig Dispense Refill    ALPRAZolam (XANAX) 0 5 mg tablet Take 1 tablet (0 5 mg total) by mouth once as needed for anxiety for up to 1 dose 20 tablet 0    amitriptyline (ELAVIL) 10 mg tablet Take 1 tablet (10 mg total) by mouth daily at bedtime (Patient not taking: Reported on 3/16/2022 ) 30 tablet 2    calcium citrate-vitamin D (CITRACAL+D) 315-200 MG-UNIT per tablet Take 1 tablet by mouth 2 (two) times a day      meclizine (ANTIVERT) 25 mg tablet Take 1 tablet (25 mg total) by mouth every 8 (eight) hours as needed for dizziness 30 tablet 0    Multiple Vitamin (MULTIVITAMIN) tablet Take 1 tablet by mouth daily        neomycin-polymyxin-hydrocortisone (CORTISPORIN) otic solution Administer 4 drops into both ears every 6 (six) hours for 7 days 10 mL 0    potassium chloride (K-DUR,KLOR-CON) 20 mEq tablet 2 tablets today and two tablets tomorrow (Patient not taking: Reported on 2/22/2022 ) 4 tablet 0    Probiotic Product (PROBIOTIC-10 PO) Take by mouth      VITAMIN D, CHOLECALCIFEROL, PO Take by mouth       No current facility-administered medications for this visit       No Known Allergies   Immunizations:     Immunization History   Administered Date(s) Administered    Influenza, seasonal, injectable 10/14/2010, 09/28/2011    Tuberculin Skin Test-PPD Intradermal 08/25/2014      Health Maintenance:         Topic Date Due    Colorectal Cancer Screening  Never done    Breast Cancer Screening: Mammogram  11/02/2022    Hepatitis C Screening  Completed         Topic Date Due    COVID-19 Vaccine (1) Never done    DTaP,Tdap,and Td Vaccines (1 - Tdap) Never done    Pneumococcal Vaccine: 65+ Years (1 of 1 - PPSV23) Never done    Influenza Vaccine (1) 09/01/2021      Medicare Health Risk Assessment:     Ht 5' 3" (1 6 m)   BMI 24 80 kg/m²      Jarrod Ovalle is here for her Initial Wellness visit  Health Risk Assessment:   Patient rates overall health as poor  Patient feels that their physical health rating is slightly worse  Patient is dissatisfied with their life  Eyesight was rated as same  Hearing was rated as same  Patient feels that their emotional and mental health rating is much worse  Patients states they are always angry  Patient states they are often unusually tired/fatigued  Pain experienced in the last 7 days has been some  Patient states that she has experienced no weight loss or gain in last 6 months  Leg cramping    Depression Screening:   PHQ-2 Score: 4  PHQ-9 Score: 14      Fall Risk Screening: In the past year, patient has experienced: no history of falling in past year      Urinary Incontinence Screening:   Patient has not leaked urine accidently in the last six months  Home Safety:  Patient does not have trouble with stairs inside or outside of their home  Patient has working smoke alarms and has working carbon monoxide detector  Home safety hazards include: none  Nutrition:   Current diet is Regular and Unhealthy  Medications:   Patient is currently taking over-the-counter supplements  OTC medications include: see medication list  Patient is able to manage medications  Activities of Daily Living (ADLs)/Instrumental Activities of Daily Living (IADLs):   Walk and transfer into and out of bed and chair?: Yes  Dress and groom yourself?: Yes    Bathe or shower yourself?: Yes    Feed yourself?  Yes  Do your laundry/housekeeping?: Yes  Manage your money, pay your bills and track your expenses?: Yes  Make your own meals?: Yes    Do your own shopping?: Yes    Previous Hospitalizations:   Any hospitalizations or ED visits within the last 12 months?: No      Advance Care Planning:   Living will: No    Durable POA for healthcare: No    End of Life Decisions reviewed with patient: Yes      Comments: Declines information  Cognitive Screening:   Provider or family/friend/caregiver concerned regarding cognition?: No    PREVENTIVE SCREENINGS      Cardiovascular Screening:    General: Screening Not Indicated, History Lipid Disorder and Risks and Benefits Discussed      Diabetes Screening:     General: Screening Current and Risks and Benefits Discussed    Due for: Blood Glucose      Colorectal Cancer Screening:     General: Risks and Benefits Discussed      Breast Cancer Screening:     General: Screening Current      Cervical Cancer Screening:    General: Screening Not Indicated      Osteoporosis Screening:    General: Screening Current      Abdominal Aortic Aneurysm (AAA) Screening:        General: Screening Not Indicated      Lung Cancer Screening:     General: Screening Not Indicated      Hepatitis C Screening:    General: Screening Current    Screening, Brief Intervention, and Referral to Treatment (SBIRT)    Screening      AUDIT-C Screenin) How often did you have a drink containing alcohol in the past year? monthly or less  2) How many drinks did you have on a typical day when you were drinking in the past year?  1 to 2  3) How often did you have 6 or more drinks on one occasion in the past year? never    AUDIT-C Score: 1  Interpretation: Score 0-2 (female): Negative screen for alcohol misuse    Single Item Drug Screening:  How often have you used an illegal drug (including marijuana) or a prescription medication for non-medical reasons in the past year? never    Single Item Drug Screen Score: 0  Interpretation: Negative screen for possible drug use disorder    Brief Intervention  Alcohol & drug use screenings were reviewed  No concerns regarding substance use disorder identified  Other Counseling Topics:   Car/seat belt/driving safety, skin self-exam, sunscreen and regular weightbearing exercise and calcium and vitamin D intake         Homa Flanagan MD

## 2022-03-24 NOTE — ASSESSMENT & PLAN NOTE
With anxiety  This is worsening  She is on a wait list for psychiatry and is waiting to hear  In the meanwhile will try SSRI  She states she is very sensitive to medications and will start with 5 mg dose  Possible side effects were discussed  She was asked to follow up in 3 weeks or sooner for any issues or complaints  She was also asked to call and also keep an open line of communication with her  for any worsening symptoms  Advised ER for any crisis issues

## 2022-03-25 DIAGNOSIS — F41.1 GENERALIZED ANXIETY DISORDER: ICD-10-CM

## 2022-03-25 DIAGNOSIS — F40.01 PANIC DISORDER WITH AGORAPHOBIA: ICD-10-CM

## 2022-03-25 RX ORDER — ALPRAZOLAM 0.5 MG/1
0.5 TABLET ORAL ONCE AS NEEDED
Qty: 20 TABLET | Refills: 0 | Status: SHIPPED | OUTPATIENT
Start: 2022-03-25 | End: 2022-04-14 | Stop reason: SDUPTHER

## 2022-03-28 NOTE — PROGRESS NOTES
Patient request to be D/C from PT, reports she has a lot of medical issues going on right now and wants to focus on other things  She states that she will return to therapy if needed in the future  At this time, patient will be D/C from skilled PT services  Island Pedicle Flap-Requiring Vessel Identification Text: The defect edges were debeveled with a #15 scalpel blade.  Given the location of the defect, shape of the defect and the proximity to free margins an island pedicle advancement flap was deemed most appropriate.  Using a sterile surgical marker, an appropriate advancement flap was drawn, based on the axial vessel mentioned above, incorporating the defect, outlining the appropriate donor tissue and placing the expected incisions within the relaxed skin tension lines where possible.    The area thus outlined was incised deep to adipose tissue with a #15 scalpel blade.  The skin margins were undermined to an appropriate distance in all directions around the primary defect and laterally outward around the island pedicle utilizing iris scissors.  There was minimal undermining beneath the pedicle flap.

## 2022-03-31 ENCOUNTER — TELEPHONE (OUTPATIENT)
Dept: FAMILY MEDICINE CLINIC | Facility: CLINIC | Age: 67
End: 2022-03-31

## 2022-03-31 NOTE — TELEPHONE ENCOUNTER
Spoke to patient, she stated that she is constipated and would like to know some medications that she can take to help  She also stated that both of her legs are burning and aching all the time  Please advise    Katherin Fields LPN

## 2022-03-31 NOTE — TELEPHONE ENCOUNTER
Pls call pt back, her  called stating that her legs are still sore and stomach issues haven't improved  She was just seen for these issues and no improvement  Was started on Lexapro and understands that takes time to work but really needs help with the soreness in legs, can't get rest   Pls advise

## 2022-04-14 ENCOUNTER — OFFICE VISIT (OUTPATIENT)
Dept: FAMILY MEDICINE CLINIC | Facility: CLINIC | Age: 67
End: 2022-04-14
Payer: MEDICARE

## 2022-04-14 VITALS
OXYGEN SATURATION: 100 % | HEART RATE: 100 BPM | TEMPERATURE: 97.8 F | WEIGHT: 140 LBS | SYSTOLIC BLOOD PRESSURE: 130 MMHG | BODY MASS INDEX: 24.8 KG/M2 | RESPIRATION RATE: 16 BRPM | DIASTOLIC BLOOD PRESSURE: 80 MMHG | HEIGHT: 63 IN

## 2022-04-14 DIAGNOSIS — F40.01 PANIC DISORDER WITH AGORAPHOBIA: Primary | ICD-10-CM

## 2022-04-14 DIAGNOSIS — F41.1 GENERALIZED ANXIETY DISORDER: ICD-10-CM

## 2022-04-14 PROCEDURE — 99213 OFFICE O/P EST LOW 20 MIN: CPT | Performed by: INTERNAL MEDICINE

## 2022-04-14 RX ORDER — ALPRAZOLAM 0.5 MG/1
0.5 TABLET ORAL ONCE AS NEEDED
Qty: 20 TABLET | Refills: 0 | Status: SHIPPED | OUTPATIENT
Start: 2022-04-14 | End: 2022-04-15 | Stop reason: SDUPTHER

## 2022-04-14 RX ORDER — ESCITALOPRAM OXALATE 10 MG/1
10 TABLET ORAL DAILY
Qty: 30 TABLET | Refills: 3 | Status: SHIPPED | OUTPATIENT
Start: 2022-04-14 | End: 2022-04-27 | Stop reason: SDUPTHER

## 2022-04-14 RX ORDER — FLUTICASONE PROPIONATE 50 MCG
1 SPRAY, SUSPENSION (ML) NASAL DAILY
COMMUNITY

## 2022-04-14 RX ORDER — IBUPROFEN 200 MG
TABLET ORAL EVERY 6 HOURS PRN
COMMUNITY

## 2022-04-14 NOTE — PROGRESS NOTES
Assessment/Plan:    1  Panic disorder with agoraphobia  Assessment & Plan: This is slightly improved  She will increase her escitalopram to 10 mg daily  Try to cut back on xanax as the escitalopram kicks in  Asked patient to call sooner than next scheduled appointment for any complaints or issues  Orders:  -     escitalopram (LEXAPRO) 10 mg tablet; Take 1 tablet (10 mg total) by mouth daily  -     ALPRAZolam (XANAX) 0 5 mg tablet; Take 1 tablet (0 5 mg total) by mouth once as needed for anxiety for up to 1 dose    2  Generalized anxiety disorder  Assessment & Plan: This is slightly improved  She will increase her escitalopram to 10 mg daily  Try to cut back on xanax as the escitalopram kicks in  Asked patient to call sooner than next scheduled appointment for any complaints or issues  Orders:  -     escitalopram (LEXAPRO) 10 mg tablet; Take 1 tablet (10 mg total) by mouth daily  -     ALPRAZolam (XANAX) 0 5 mg tablet; Take 1 tablet (0 5 mg total) by mouth once as needed for anxiety for up to 1 dose            There are no Patient Instructions on file for this visit  Return in about 4 weeks (around 5/12/2022)  Subjective:      Patient ID: Darryle Sequin is a 79 y o  female  Chief Complaint   Patient presents with    Follow-up     on meds, [place]        She and her  feel that the lexapro is helping but not enough  She is taking the xanax but 1/2 tablet 2-3 times per day  She has not noticed any side effects from the lexapro  Legs are not as heavy as they were  Her anxiety is lessening and sleep is improving a bit  There is no SI/HI  The following portions of the patient's history were reviewed and updated as appropriate: allergies, current medications, past family history, past medical history, past social history, past surgical history and problem list     Review of Systems   Constitutional: Negative  Respiratory: Negative  Cardiovascular: Negative  Psychiatric/Behavioral: Positive for sleep disturbance  Negative for dysphoric mood  The patient is nervous/anxious  Current Outpatient Medications   Medication Sig Dispense Refill    ALPRAZolam (XANAX) 0 5 mg tablet Take 1 tablet (0 5 mg total) by mouth once as needed for anxiety for up to 1 dose 20 tablet 0    calcium citrate-vitamin D (CITRACAL+D) 315-200 MG-UNIT per tablet Take 1 tablet by mouth 2 (two) times a day      escitalopram (LEXAPRO) 10 mg tablet Take 1 tablet (10 mg total) by mouth daily 30 tablet 3    fluticasone (FLONASE) 50 mcg/act nasal spray 1 spray into each nostril daily      ibuprofen (MOTRIN) 200 mg tablet Take by mouth every 6 (six) hours as needed for mild pain      Multiple Vitamin (MULTIVITAMIN) tablet Take 1 tablet by mouth daily        Probiotic Product (PROBIOTIC-10 PO) Take by mouth      VITAMIN D, CHOLECALCIFEROL, PO Take by mouth       No current facility-administered medications for this visit  Objective:    /80   Pulse 100   Temp 97 8 °F (36 6 °C)   Resp 16   Ht 5' 3" (1 6 m)   Wt 63 5 kg (140 lb)   SpO2 100%   BMI 24 80 kg/m²        Physical Exam  Constitutional:       Appearance: She is well-developed  Eyes:      Conjunctiva/sclera: Conjunctivae normal    Neck:      Thyroid: No thyromegaly  Vascular: No JVD  Cardiovascular:      Rate and Rhythm: Normal rate and regular rhythm  Heart sounds: No friction rub  Pulmonary:      Effort: Pulmonary effort is normal       Breath sounds: Normal breath sounds  Musculoskeletal:      Cervical back: Neck supple  Psychiatric:         Mood and Affect: Mood is anxious  Behavior: Behavior normal          Thought Content:  Thought content normal          Judgment: Judgment normal                 Ely Caba MD

## 2022-04-14 NOTE — ASSESSMENT & PLAN NOTE
This is slightly improved  She will increase her escitalopram to 10 mg daily  Try to cut back on xanax as the escitalopram kicks in  Asked patient to call sooner than next scheduled appointment for any complaints or issues

## 2022-04-15 ENCOUNTER — TELEPHONE (OUTPATIENT)
Dept: FAMILY MEDICINE CLINIC | Facility: CLINIC | Age: 67
End: 2022-04-15

## 2022-04-15 DIAGNOSIS — F40.01 PANIC DISORDER WITH AGORAPHOBIA: ICD-10-CM

## 2022-04-15 DIAGNOSIS — F41.1 GENERALIZED ANXIETY DISORDER: ICD-10-CM

## 2022-04-15 RX ORDER — ALPRAZOLAM 0.5 MG/1
0.5 TABLET ORAL 2 TIMES DAILY PRN
Qty: 20 TABLET | Refills: 0 | Status: SHIPPED | OUTPATIENT
Start: 2022-04-15 | End: 2022-04-27 | Stop reason: SDUPTHER

## 2022-04-15 NOTE — TELEPHONE ENCOUNTER
[place] prescription monitoring program report reviewed and is appropriate        I sent in a new prescription with new directions  She should be able to fill it now  Thank you,  Noemy Bonner, DO

## 2022-04-15 NOTE — TELEPHONE ENCOUNTER
Called [facility] and corrected the problem  Patient informed that it was sent    Adelaida Bachelor, MA

## 2022-04-15 NOTE — TELEPHONE ENCOUNTER
ALPRAZolam (XANAX) 0 5 mg tablet [[id number]]     Order Details  Dose: 0 5 mg Route: Oral Frequency: Once as needed for anxiety   Dispense Quantity: 20 tablet Refills: 0          Sig: Take 1 tablet (0 5 mg total) by mouth once as needed for anxiety for up to 1 dose     Patient stated pharmacy won't refill until April 20th  She stated she takes 1/2 a pill at 3 and another half before bed    She needs medications today

## 2022-04-15 NOTE — TELEPHONE ENCOUNTER
Please call [place] [employer] from [facility] regarding xanax directions    Thank You

## 2022-04-26 DIAGNOSIS — F41.1 GENERALIZED ANXIETY DISORDER: ICD-10-CM

## 2022-04-26 DIAGNOSIS — F40.01 PANIC DISORDER WITH AGORAPHOBIA: ICD-10-CM

## 2022-04-27 RX ORDER — ESCITALOPRAM OXALATE 20 MG/1
20 TABLET ORAL DAILY
Qty: 30 TABLET | Refills: 3 | Status: SHIPPED | OUTPATIENT
Start: 2022-04-27

## 2022-04-27 RX ORDER — ALPRAZOLAM 0.5 MG/1
0.5 TABLET ORAL 2 TIMES DAILY PRN
Qty: 20 TABLET | Refills: 0 | OUTPATIENT
Start: 2022-04-27

## 2022-04-27 RX ORDER — ALPRAZOLAM 0.5 MG/1
0.5 TABLET ORAL DAILY PRN
Qty: 20 TABLET | Refills: 0 | Status: SHIPPED | OUTPATIENT
Start: 2022-04-27

## 2022-04-27 NOTE — TELEPHONE ENCOUNTER
Called and advised patient that Dr Cyndi Bush is refusing medication at this time due to the fact that patient was just seen on 4/14 and was told that she was not to take medication on a daily basis  Patient's escitalopram was increased to 10mg and she was to let that kick in  Patient states she needs the medication to get through the day  She is currently taking 1 5 pills a day, 0 25mg tid  She stated that she was about to throw up just having the conversation with me on the phone  She states she can't even leave her house without taking medication  She has to take 0 25mg in order to sleep  Patient started to get very upset, and asked to speak with Dr Cyndi Bush  I advised her that Dr Cyndi Bush was seeing patient's and patient started to panic  I advised patient that if she was this dependent on this medication she may need to be evaluated at the hospital as it is not normal to need this medication on a daily a basis, it is an as needed medication to help with anxiety  Patient is asking to please speak with Dr Cyndi Bush when she is done seeing patient's

## 2022-04-27 NOTE — TELEPHONE ENCOUNTER
I called the patient  She has been taking the xanax regularly  We had discussed at last visit how she should not be taking this regularly  However, she still feels that her anxiety is bad enough that she needs to  Will increase escitalopram to 20 mg daily  She does have a follow up appointment here  She also was referred to psychiatry  I did refill her xanax but advised patient to try not to use regularly  No further action needed

## 2022-05-18 ENCOUNTER — OFFICE VISIT (OUTPATIENT)
Dept: FAMILY MEDICINE CLINIC | Facility: CLINIC | Age: 67
End: 2022-05-18
Payer: MEDICARE

## 2022-05-18 VITALS
HEIGHT: 63 IN | BODY MASS INDEX: 24.63 KG/M2 | RESPIRATION RATE: 16 BRPM | DIASTOLIC BLOOD PRESSURE: 90 MMHG | WEIGHT: 139 LBS | HEART RATE: 88 BPM | TEMPERATURE: 98 F | SYSTOLIC BLOOD PRESSURE: 140 MMHG

## 2022-05-18 DIAGNOSIS — L30.9 DERMATITIS: ICD-10-CM

## 2022-05-18 DIAGNOSIS — F40.01 PANIC DISORDER WITH AGORAPHOBIA: Primary | ICD-10-CM

## 2022-05-18 DIAGNOSIS — B35.9 TINEA: ICD-10-CM

## 2022-05-18 PROCEDURE — 99213 OFFICE O/P EST LOW 20 MIN: CPT | Performed by: INTERNAL MEDICINE

## 2022-05-18 PROCEDURE — 86618 LYME DISEASE ANTIBODY: CPT | Performed by: INTERNAL MEDICINE

## 2022-05-18 PROCEDURE — 36415 COLL VENOUS BLD VENIPUNCTURE: CPT | Performed by: INTERNAL MEDICINE

## 2022-05-18 RX ORDER — CLOTRIMAZOLE AND BETAMETHASONE DIPROPIONATE 10; .64 MG/G; MG/G
CREAM TOPICAL 2 TIMES DAILY
Qty: 30 G | Refills: 1 | Status: SHIPPED | OUTPATIENT
Start: 2022-05-18

## 2022-05-18 NOTE — ASSESSMENT & PLAN NOTE
Mildly improved but not to goal   Will continue escitalopram but she prefers to wait until she sees psychiatry prior to making any further changes  Again asked patient to avoid using xanax regularly  Asked patient to call sooner than next scheduled appointment for any complaints or issues

## 2022-05-18 NOTE — PROGRESS NOTES
Assessment/Plan:    1  Panic disorder with agoraphobia  Assessment & Plan:  Mildly improved but not to goal   Will continue escitalopram but she prefers to wait until she sees psychiatry prior to making any further changes  Again asked patient to avoid using xanax regularly  Asked patient to call sooner than next scheduled appointment for any complaints or issues  2  Tinea  -     clotrimazole-betamethasone (LOTRISONE) 1-0 05 % cream; Apply topically 2 (two) times a day    3  Dermatitis  -     Lyme Antibody Profile with reflex to WB            There are no Patient Instructions on file for this visit  Return in about 6 months (around 11/18/2022)  Subjective:      Patient ID: Kendall Ortiz is a 79 y o  female  Chief Complaint   Patient presents with    Follow-up     Medication ac/lpn       She got COVID one week ago  She is still very fatigued and congested  Prior to this she had felt that things were getting better  She was feeling less anxious  Thinks the escitalopram is helping  Denies SI/HI  Has initial appointment with DR Lukas Solis 6/13  Has an itchy rash on L chest   Assumed she got bitten by something, does not recall seeing or noticing anything biting her  Denies fever, joint pain  The following portions of the patient's history were reviewed and updated as appropriate: allergies, current medications, past family history, past medical history, past social history, past surgical history and problem list     Review of Systems   Constitutional: Negative  Respiratory: Negative  Cardiovascular: Negative  Skin: Positive for rash  Psychiatric/Behavioral: Positive for sleep disturbance  Negative for dysphoric mood, self-injury and suicidal ideas  The patient is nervous/anxious            Current Outpatient Medications   Medication Sig Dispense Refill    ALPRAZolam (XANAX) 0 5 mg tablet Take 1 tablet (0 5 mg total) by mouth daily as needed for anxiety for up to 1 dose 20 tablet 0    calcium citrate-vitamin D (CITRACAL+D) 315-200 MG-UNIT per tablet Take 1 tablet by mouth 2 (two) times a day      clotrimazole-betamethasone (LOTRISONE) 1-0 05 % cream Apply topically 2 (two) times a day 30 g 1    escitalopram (LEXAPRO) 20 mg tablet Take 1 tablet (20 mg total) by mouth daily 30 tablet 3    fluticasone (FLONASE) 50 mcg/act nasal spray 1 spray into each nostril daily      ibuprofen (MOTRIN) 200 mg tablet Take by mouth every 6 (six) hours as needed for mild pain      Multiple Vitamin (MULTIVITAMIN) tablet Take 1 tablet by mouth daily        Probiotic Product (PROBIOTIC-10 PO) Take by mouth      VITAMIN D, CHOLECALCIFEROL, PO Take by mouth       No current facility-administered medications for this visit  Objective:    /90   Pulse 88   Temp 98 °F (36 7 °C)   Resp 16   Ht 5' 3" (1 6 m)   Wt 63 kg (139 lb)   BMI 24 62 kg/m²        Physical Exam  Constitutional:       Appearance: She is well-developed  Eyes:      Conjunctiva/sclera: Conjunctivae normal    Neck:      Thyroid: No thyromegaly  Vascular: No JVD  Cardiovascular:      Rate and Rhythm: Normal rate and regular rhythm  Heart sounds: Normal heart sounds  No murmur heard  No friction rub  No gallop  Pulmonary:      Effort: Pulmonary effort is normal       Breath sounds: Normal breath sounds  No wheezing or rales  Abdominal:      General: Bowel sounds are normal  There is no distension  Palpations: Abdomen is soft  Tenderness: There is no abdominal tenderness  Musculoskeletal:      Cervical back: Neck supple  Skin:     Findings: Rash present  Comments: 6 cm ovoid macular erythematous rash L upper chest   Psychiatric:         Mood and Affect: Mood is anxious  Speech: Speech normal          Behavior: Behavior normal          Thought Content:  Thought content normal       Comments: Anxious mood but appears improved from previous                Mary Sal MD

## 2022-05-19 LAB — B BURGDOR IGG+IGM SER-ACNC: 38

## 2023-06-15 ENCOUNTER — TELEPHONE (OUTPATIENT)
Dept: FAMILY MEDICINE CLINIC | Facility: CLINIC | Age: 68
End: 2023-06-15

## 2023-06-15 NOTE — TELEPHONE ENCOUNTER
Outreach made to schedule patient for awv  Patient says she would call back to make an appointment     Michelle Palafox MA

## 2023-06-25 ENCOUNTER — OFFICE VISIT (OUTPATIENT)
Dept: URGENT CARE | Facility: CLINIC | Age: 68
End: 2023-06-25
Payer: MEDICARE

## 2023-06-25 VITALS
TEMPERATURE: 98.5 F | SYSTOLIC BLOOD PRESSURE: 157 MMHG | OXYGEN SATURATION: 98 % | DIASTOLIC BLOOD PRESSURE: 72 MMHG | RESPIRATION RATE: 20 BRPM | WEIGHT: 153 LBS | BODY MASS INDEX: 28.16 KG/M2 | HEART RATE: 92 BPM | HEIGHT: 62 IN

## 2023-06-25 DIAGNOSIS — R05.1 ACUTE COUGH: ICD-10-CM

## 2023-06-25 DIAGNOSIS — J06.9 VIRAL UPPER RESPIRATORY TRACT INFECTION: Primary | ICD-10-CM

## 2023-06-25 PROCEDURE — 99213 OFFICE O/P EST LOW 20 MIN: CPT | Performed by: PHYSICIAN ASSISTANT

## 2023-06-25 RX ORDER — BENZONATATE 200 MG/1
200 CAPSULE ORAL 3 TIMES DAILY PRN
Qty: 20 CAPSULE | Refills: 0 | Status: SHIPPED | OUTPATIENT
Start: 2023-06-25

## 2023-06-25 NOTE — PROGRESS NOTES
[address]        NAME: Rafael Soria is a 76 y o  female  : 1955    MRN: [id number]  DATE: 2023  TIME: 2:29 PM    Assessment and Plan   Viral upper respiratory tract infection [J06 9]  1  Viral upper respiratory tract infection        2  Acute cough  benzonatate (TESSALON) 200 MG capsule        Patient Instructions   Viral upper respiratory infection  rx tessalon pearls three times a day sent via EMR  Recommend flonase nasal spray for postnasal drip/cough  Warm salt water gargles  Rest, fluids and supportive care  May benefit from a cool mist humidifier on night stand  Tylenol/ibuprofen as needed for pain/fever    Follow up with PCP in 3-5 days  Proceed to  ER if symptoms worsen  Chief Complaint     Chief Complaint   Patient presents with   • Cough     Pt reports of worsening cough and congestion x 1 week  History of Present Illness       Fina Cervantes is a 78-year-old female who presents to clinic complaining of cough x5 days  She describes the cough as dry, nonproductive, and worse when she lays down at night  She also notes nasal congestion, sneezing, sore throat, bilateral ear pain  She states the sore throat is improving  She in terms of the ear pain her left hurts slightly worse than her right  She denies any fever, chills, fatigue, myalgias, shortness of breath, nausea, vomiting, diarrhea, loss of taste or smell, recent travel, or exposure to anyone known COVID-positive  She did a COVID at home test and it was negative  Review of Systems   Review of Systems   Constitutional: Positive for fatigue  Negative for chills and fever  HENT: Positive for congestion, ear pain, sneezing and sore throat  Negative for rhinorrhea, sinus pressure and sinus pain  Respiratory: Positive for cough and chest tightness  Negative for shortness of breath  Gastrointestinal: Negative for diarrhea, nausea and vomiting  Musculoskeletal: Negative for myalgias     Neurological: Negative for headaches  Current Medications       Current Outpatient Medications:   •  benzonatate (TESSALON) 200 MG capsule, Take 1 capsule (200 mg total) by mouth 3 (three) times a day as needed for cough, Disp: 20 capsule, Rfl: 0  •  ALPRAZolam (XANAX) 0 5 mg tablet, Take 1 tablet (0 5 mg total) by mouth daily as needed for anxiety for up to 1 dose, Disp: 20 tablet, Rfl: 0  •  calcium citrate-vitamin D (CITRACAL+D) 315-200 MG-UNIT per tablet, Take 1 tablet by mouth 2 (two) times a day, Disp: , Rfl:   •  clotrimazole-betamethasone (LOTRISONE) 1-0 05 % cream, Apply topically 2 (two) times a day, Disp: 30 g, Rfl: 1  •  escitalopram (LEXAPRO) 20 mg tablet, Take 1 tablet (20 mg total) by mouth daily, Disp: 30 tablet, Rfl: 3  •  fluticasone (FLONASE) 50 mcg/act nasal spray, 1 spray into each nostril daily, Disp: , Rfl:   •  ibuprofen (MOTRIN) 200 mg tablet, Take by mouth every 6 (six) hours as needed for mild pain, Disp: , Rfl:   •  Multiple Vitamin (MULTIVITAMIN) tablet, Take 1 tablet by mouth daily  , Disp: , Rfl:   •  Probiotic Product (PROBIOTIC-10 PO), Take by mouth, Disp: , Rfl:   •  VITAMIN D, CHOLECALCIFEROL, PO, Take by mouth, Disp: , Rfl:     Current Allergies     Allergies as of 06/25/2023   • (No Known Allergies)            The following portions of the patient's history were reviewed and updated as appropriate: allergies, current medications, past family history, past medical history, past social history, past surgical history and problem list      History reviewed  No pertinent past medical history      Past Surgical History:   Procedure Laterality Date   • HYSTERECTOMY     • TONSILLECTOMY         Family History   Problem Relation Age of Onset   • Breast cancer Maternal Grandmother    • Heart disease Mother    • Hyperlipidemia Mother    • Hypothyroidism Mother    • No Known Problems Father    • No Known Problems Sister    • No Known Problems Daughter    • No Known Problems Sister    • No Known "Problems Sister    • No Known Problems Sister          Medications have been verified  Objective   /72   Pulse 92   Temp 98 5 °F (36 9 °C)   Resp 20   Ht 5' 2\" (1 575 m)   Wt 69 4 kg (153 lb)   SpO2 98%   BMI 27 98 kg/m²   No LMP recorded  Patient has had a hysterectomy  Physical Exam     Physical Exam  Vitals and nursing note reviewed  Constitutional:       General: She is not in acute distress  Appearance: Normal appearance  She is not ill-appearing  HENT:      Right Ear: Tympanic membrane, ear canal and external ear normal       Left Ear: Tympanic membrane, ear canal and external ear normal       Nose: Congestion present  Right Sinus: No maxillary sinus tenderness or frontal sinus tenderness  Left Sinus: No maxillary sinus tenderness or frontal sinus tenderness  Mouth/Throat:      Mouth: Mucous membranes are moist       Pharynx: No oropharyngeal exudate or posterior oropharyngeal erythema  Cardiovascular:      Rate and Rhythm: Normal rate and regular rhythm  Heart sounds: Normal heart sounds  Pulmonary:      Effort: Pulmonary effort is normal  No respiratory distress  Breath sounds: Normal breath sounds  No stridor  No wheezing, rhonchi or rales  Lymphadenopathy:      Cervical: No cervical adenopathy  Neurological:      Mental Status: She is alert and oriented to person, place, and time     Psychiatric:         Mood and Affect: Mood normal          Behavior: Behavior normal                    "

## 2023-08-25 ENCOUNTER — RA CDI HCC (OUTPATIENT)
Dept: OTHER | Facility: HOSPITAL | Age: 68
End: 2023-08-25

## 2023-08-25 NOTE — PROGRESS NOTES
[address] [facility] coding opportunities       Chart reviewed, no opportunity found: CHART REVIEWED, NO OPPORTUNITY FOUND        Patients Insurance     Medicare Insurance: Medicare

## 2023-08-30 ENCOUNTER — OFFICE VISIT (OUTPATIENT)
Dept: FAMILY MEDICINE CLINIC | Facility: CLINIC | Age: 68
End: 2023-08-30
Payer: MEDICARE

## 2023-08-30 VITALS
DIASTOLIC BLOOD PRESSURE: 70 MMHG | HEIGHT: 62 IN | TEMPERATURE: 97.8 F | SYSTOLIC BLOOD PRESSURE: 124 MMHG | RESPIRATION RATE: 18 BRPM | WEIGHT: 153 LBS | OXYGEN SATURATION: 95 % | BODY MASS INDEX: 28.16 KG/M2 | HEART RATE: 90 BPM

## 2023-08-30 DIAGNOSIS — Z00.00 MEDICARE ANNUAL WELLNESS VISIT, SUBSEQUENT: Primary | ICD-10-CM

## 2023-08-30 DIAGNOSIS — Z12.31 ENCOUNTER FOR SCREENING MAMMOGRAM FOR MALIGNANT NEOPLASM OF BREAST: ICD-10-CM

## 2023-08-30 DIAGNOSIS — Z12.39 SCREENING BREAST EXAMINATION: ICD-10-CM

## 2023-08-30 DIAGNOSIS — F33.9 DEPRESSION, RECURRENT (HCC): ICD-10-CM

## 2023-08-30 DIAGNOSIS — E78.2 MIXED HYPERLIPIDEMIA: ICD-10-CM

## 2023-08-30 DIAGNOSIS — Z12.11 COLON CANCER SCREENING: ICD-10-CM

## 2023-08-30 PROCEDURE — G0439 PPPS, SUBSEQ VISIT: HCPCS | Performed by: INTERNAL MEDICINE

## 2023-08-30 RX ORDER — RIBOFLAVIN (VITAMIN B2) 100 MG
100 TABLET ORAL DAILY
COMMUNITY

## 2023-08-30 NOTE — PATIENT INSTRUCTIONS
Medicare Preventive Visit Patient Instructions  Thank you for completing your Welcome to Medicare Visit or Medicare Annual Wellness Visit today. Your next wellness visit will be due in one year (8/30/2024). The screening/preventive services that you may require over the next 5-10 years are detailed below. Some tests may not apply to you based off risk factors and/or age. Screening tests ordered at today's visit but not completed yet may show as past due. Also, please note that scanned in results may not display below. Preventive Screenings:  Service Recommendations Previous Testing/Comments   Colorectal Cancer Screening  * Colonoscopy    * Fecal Occult Blood Test (FOBT)/Fecal Immunochemical Test (FIT)  * Fecal DNA/Cologuard Test  * Flexible Sigmoidoscopy Age: 43-73 years old   Colonoscopy: every 10 years (may be performed more frequently if at higher risk)  OR  FOBT/FIT: every 1 year  OR  Cologuard: every 3 years  OR  Sigmoidoscopy: every 5 years  Screening may be recommended earlier than age 39 if at higher risk for colorectal cancer. Also, an individualized decision between you and your healthcare provider will decide whether screening between the ages of 77-80 would be appropriate. Colonoscopy: Not on file  FOBT/FIT: Not on file  Cologuard: Not on file  Sigmoidoscopy: Not on file          Breast Cancer Screening Age: 36 years old  Frequency: every 1-2 years  Not required if history of left and right mastectomy Mammogram: 11/02/2021    Screening Current   Cervical Cancer Screening Between the ages of 21-29, pap smear recommended once every 3 years. Between the ages of 32-69, can perform pap smear with HPV co-testing every 5 years.    Recommendations may differ for women with a history of total hysterectomy, cervical cancer, or abnormal pap smears in past. Pap Smear: Not on file    Screening Not Indicated   Hepatitis C Screening Once for adults born between 1945 and 1965  More frequently in patients at high risk for Hepatitis C Hep C Antibody: 10/12/2019    Screening Current   Diabetes Screening 1-2 times per year if you're at risk for diabetes or have pre-diabetes Fasting glucose: 112 mg/dL (11/29/2021)  A1C: 5.5 % (11/29/2021)      Cholesterol Screening Once every 5 years if you don't have a lipid disorder. May order more often based on risk factors. Lipid panel: 10/12/2019    Screening Not Indicated  History Lipid Disorder     Other Preventive Screenings Covered by Medicare:  1. Abdominal Aortic Aneurysm (AAA) Screening: covered once if your at risk. You're considered to be at risk if you have a family history of AAA. 2. Lung Cancer Screening: covers low dose CT scan once per year if you meet all of the following conditions: (1) Age 48-67; (2) No signs or symptoms of lung cancer; (3) Current smoker or have quit smoking within the last 15 years; (4) You have a tobacco smoking history of at least 20 pack years (packs per day multiplied by number of years you smoked); (5) You get a written order from a healthcare provider. 3. Glaucoma Screening: covered annually if you're considered high risk: (1) You have diabetes OR (2) Family history of glaucoma OR (3)  aged 48 and older OR (3)  American aged 72 and older  3. Osteoporosis Screening: covered every 2 years if you meet one of the following conditions: (1) You're estrogen deficient and at risk for osteoporosis based off medical history and other findings; (2) Have a vertebral abnormality; (3) On glucocorticoid therapy for more than 3 months; (4) Have primary hyperparathyroidism; (5) On osteoporosis medications and need to assess response to drug therapy. · Last bone density test (DXA Scan): 07/21/2020.  5. HIV Screening: covered annually if you're between the age of 15-65. Also covered annually if you are younger than 13 and older than 72 with risk factors for HIV infection.  For pregnant patients, it is covered up to 3 times per pregnancy. Immunizations:  Immunization Recommendations   Influenza Vaccine Annual influenza vaccination during flu season is recommended for all persons aged >= 6 months who do not have contraindications   Pneumococcal Vaccine   * Pneumococcal conjugate vaccine = PCV13 (Prevnar 13), PCV15 (Vaxneuvance), PCV20 (Prevnar 20)  * Pneumococcal polysaccharide vaccine = PPSV23 (Pneumovax) Adults 20-63 years old: 1-3 doses may be recommended based on certain risk factors  Adults 72 years old: 1-2 doses may be recommended based off what pneumonia vaccine you previously received   Hepatitis B Vaccine 3 dose series if at intermediate or high risk (ex: diabetes, end stage renal disease, liver disease)   Tetanus (Td) Vaccine - COST NOT COVERED BY MEDICARE PART B Following completion of primary series, a booster dose should be given every 10 years to maintain immunity against tetanus. Td may also be given as tetanus wound prophylaxis. Tdap Vaccine - COST NOT COVERED BY MEDICARE PART B Recommended at least once for all adults. For pregnant patients, recommended with each pregnancy. Shingles Vaccine (Shingrix) - COST NOT COVERED BY MEDICARE PART B  2 shot series recommended in those aged 48 and above     Health Maintenance Due:      Topic Date Due   • Colorectal Cancer Screening  Never done   • Breast Cancer Screening: Mammogram  11/02/2022   • Hepatitis C Screening  Completed     Immunizations Due:      Topic Date Due   • COVID-19 Vaccine (1) Never done   • Pneumococcal Vaccine: 65+ Years (1 - PCV) Never done   • Influenza Vaccine (1) 09/01/2023     Advance Directives   What are advance directives? Advance directives are legal documents that state your wishes and plans for medical care. These plans are made ahead of time in case you lose your ability to make decisions for yourself. Advance directives can apply to any medical decision, such as the treatments you want, and if you want to donate organs.    What are the types of advance directives? There are many types of advance directives, and each state has rules about how to use them. You may choose a combination of any of the following:  · Living will: This is a written record of the treatment you want. You can also choose which treatments you do not want, which to limit, and which to stop at a certain time. This includes surgery, medicine, IV fluid, and tube feedings. · Durable power of  for [facility]): This is a written record that states who you want to make healthcare choices for you when you are unable to make them for yourself. This person, called a proxy, is usually a family member or a friend. You may choose more than 1 proxy. · Do not resuscitate (DNR) order:  A DNR order is used in case your heart stops beating or you stop breathing. It is a request not to have certain forms of treatment, such as CPR. A DNR order may be included in other types of advance directives. · Medical directive: This covers the care that you want if you are in a coma, near death, or unable to make decisions for yourself. You can list the treatments you want for each condition. Treatment may include pain medicine, surgery, blood transfusions, dialysis, IV or tube feedings, and a ventilator (breathing machine). · Values history: This document has questions about your views, beliefs, and how you feel and think about life. This information can help others choose the care that you would choose. Why are advance directives important? An advance directive helps you control your care. Although spoken wishes may be used, it is better to have your wishes written down. Spoken wishes can be misunderstood, or not followed. Treatments may be given even if you do not want them. An advance directive may make it easier for your family to make difficult choices about your care.    Weight Management   Why it is important to manage your weight:  Being overweight increases your risk of health conditions such as heart disease, high blood pressure, type 2 diabetes, and certain types of cancer. It can also increase your risk for osteoarthritis, sleep apnea, and other respiratory problems. Aim for a slow, steady weight loss. Even a small amount of weight loss can lower your risk of health problems. How to lose weight safely:  A safe and healthy way to lose weight is to eat fewer calories and get regular exercise. You can lose up about 1 pound a week by decreasing the number of calories you eat by 500 calories each day. Healthy meal plan for weight management:  A healthy meal plan includes a variety of foods, contains fewer calories, and helps you stay healthy. A healthy meal plan includes the following:  · Eat whole-grain foods more often. A healthy meal plan should contain fiber. Fiber is the part of grains, fruits, and vegetables that is not broken down by your body. Whole-grain foods are healthy and provide extra fiber in your diet. Some examples of whole-grain foods are whole-wheat breads and pastas, oatmeal, brown rice, and bulgur. · Eat a variety of vegetables every day. Include dark, leafy greens such as spinach, kale, toño greens, and mustard greens. Eat yellow and orange vegetables such as carrots, sweet potatoes, and winter squash. · Eat a variety of fruits every day. Choose fresh or canned fruit (canned in its own juice or light syrup) instead of juice. Fruit juice has very little or no fiber. · Eat low-fat dairy foods. Drink fat-free (skim) milk or 1% milk. Eat fat-free yogurt and low-fat cottage cheese. Try low-fat cheeses such as mozzarella and other reduced-fat cheeses. · Choose meat and other protein foods that are low in fat. Choose beans or other legumes such as split peas or lentils. Choose fish, skinless poultry (chicken or turkey), or lean cuts of red meat (beef or pork). Before you cook meat or poultry, cut off any visible fat. · Use less fat and oil.   Try baking foods instead of frying them. Add less fat, such as margarine, sour cream, regular salad dressing and mayonnaise to foods. Eat fewer high-fat foods. Some examples of high-fat foods include french fries, doughnuts, ice cream, and cakes. · Eat fewer sweets. Limit foods and drinks that are high in sugar. This includes candy, cookies, regular soda, and sweetened drinks. Exercise:  Exercise at least 30 minutes per day on most days of the week. Some examples of exercise include walking, biking, dancing, and swimming. You can also fit in more physical activity by taking the stairs instead of the elevator or parking farther away from stores. Ask your healthcare provider about the best exercise plan for you. © Copyright [employer] 2018 Information is for End User's use only and may not be sold, redistributed or otherwise used for commercial purposes.  All illustrations and images included in CareNotes® are the copyrighted property of [employer] or [address] [place] [address]

## 2023-08-30 NOTE — PROGRESS NOTES
Assessment and Plan:     Problem List Items Addressed This Visit        Other    Mixed hyperlipidemia    Relevant Orders    CBC and differential    Comprehensive metabolic panel    Lipid panel    Depression, recurrent ([address])     Managed by Dr. Remi Lau and she is doing well. Other Visit Diagnoses     Medicare annual wellness visit, subsequent    -  Primary    Screening breast examination        Relevant Orders    Mammo screening bilateral w 3d & cad    Colon cancer screening        Relevant Orders    Cologuard    Encounter for screening mammogram for malignant neoplasm of breast        Relevant Orders    Mammo screening bilateral w 3d & cad           Preventive health issues were discussed with patient, and age appropriate screening tests were ordered as noted in patient's After Visit Summary. Personalized health advice and appropriate referrals for health education or preventive services given if needed, as noted in patient's After Visit Summary. History of Present Illness:     Patient presents for a Medicare Wellness Visit    HPI   Patient Care Team:  Kristofer Ratliff MD as PCP - NIC Jackson MD     Review of Systems:     Review of Systems   Constitutional: Negative. HENT: Negative. Eyes: Negative. Respiratory: Negative. Cardiovascular: Negative. Gastrointestinal: Negative. Endocrine: Negative. Genitourinary: Negative. Musculoskeletal: Negative. Skin: Negative. Allergic/Immunologic: Negative. Neurological: Negative. Hematological: Negative. Psychiatric/Behavioral: Negative. Problem List:     Patient Active Problem List   Diagnosis   • Anxiety disorder   • Exophthalmos   • Herpangina   • Mixed hyperlipidemia   • Vertigo   • Chronic rhinitis   • Hypokalemia   • Chronic headaches   • TMJ (temporomandibular joint syndrome)   • Depression, recurrent ([address])      Past Medical and Surgical History:     History reviewed.  No pertinent past medical history. Past Surgical History:   Procedure Laterality Date   • HYSTERECTOMY     • TONSILLECTOMY        Family History:     Family History   Problem Relation Age of Onset   • Breast cancer Maternal Grandmother    • Heart disease Mother    • Hyperlipidemia Mother    • Hypothyroidism Mother    • No Known Problems Father    • No Known Problems Sister    • No Known Problems Daughter    • No Known Problems Sister    • No Known Problems Sister    • No Known Problems Sister       Social History:     Social History     Socioeconomic History   • Marital status: /Civil Union     Spouse name: None   • Number of children: None   • Years of education: None   • Highest education level: None   Occupational History   • None   Tobacco Use   • Smoking status: Never   • Smokeless tobacco: Never   Vaping Use   • Vaping Use: Never used   Substance and Sexual Activity   • Alcohol use: Not Currently   • Drug use: Never   • Sexual activity: None   Other Topics Concern   • None   Social History Narrative   • None     Social Determinants of Health     Financial Resource Strain: Low Risk  (8/30/2023)    Overall Financial Resource Strain (CARDIA)    • Difficulty of Paying Living Expenses: Not hard at all   Food Insecurity: Not on file   Transportation Needs: No Transportation Needs (8/30/2023)    PRAPARE - Transportation    • Lack of Transportation (Medical): No    • Lack of Transportation (Non-Medical):  No   Physical Activity: Not on file   Stress: Not on file   Social Connections: Not on file   Intimate Partner Violence: Not on file   Housing Stability: Not on file      Medications and Allergies:     Current Outpatient Medications   Medication Sig Dispense Refill   • Ascorbic Acid (vitamin C) 100 MG tablet Take 100 mg by mouth daily     • calcium citrate-vitamin D (CITRACAL+D) 315-200 MG-UNIT per tablet Take 1 tablet by mouth 2 (two) times a day     • escitalopram (LEXAPRO) 20 mg tablet Take 1 tablet (20 mg six months. Home Safety:  Patient does not have trouble with stairs inside or outside of their home. Patient has working smoke alarms and has working carbon monoxide detector. Home safety hazards include: none. Nutrition:   Current diet is Regular. Medications:   Patient is not currently taking any over-the-counter supplements. Patient is able to manage medications. Activities of Daily Living (ADLs)/Instrumental Activities of Daily Living (IADLs):   Walk and transfer into and out of bed and chair?: Yes  Dress and groom yourself?: Yes    Bathe or shower yourself?: Yes    Feed yourself? Yes  Do your laundry/housekeeping?: Yes  Manage your money, pay your bills and track your expenses?: Yes  Make your own meals?: Yes    Do your own shopping?: Yes    Previous Hospitalizations:   Any hospitalizations or ED visits within the last 12 months?: No      Advance Care Planning:   Living will: No    Durable POA for healthcare: No    Advanced directive: No      Comments: Declines information.     Cognitive Screening:   Provider or family/friend/caregiver concerned regarding cognition?: No    PREVENTIVE SCREENINGS      Cardiovascular Screening:    General: Screening Not Indicated and History Lipid Disorder      Colorectal Cancer Screening:     General: Risks and Benefits Discussed    Due for: Cologuard      Breast Cancer Screening:     General: Screening Current and Risks and Benefits Discussed    Due for: Mammogram        Cervical Cancer Screening:    General: Screening Not Indicated      Osteoporosis Screening:    General: Patient Declines      Abdominal Aortic Aneurysm (AAA) Screening:        General: Screening Not Indicated      Lung Cancer Screening:     General: Screening Not Indicated      Hepatitis C Screening:    General: Screening Current    Screening, Brief Intervention, and Referral to Treatment (SBIRT)    Screening  Typical number of drinks in a day: 0  Typical number of drinks in a week: 0  Interpretation: Low risk drinking behavior. Single Item Drug Screening:  How often have you used an illegal drug (including marijuana) or a prescription medication for non-medical reasons in the past year? never    Single Item Drug Screen Score: 0  Interpretation: Negative screen for possible drug use disorder    Brief Intervention  Alcohol & drug use screenings were reviewed. No concerns regarding substance use disorder identified. Other Counseling Topics:   Car/seat belt/driving safety, skin self-exam, sunscreen and calcium and vitamin D intake and regular weightbearing exercise. No results found. Physical Exam:     /70   Pulse 90   Temp 97.8 °F (36.6 °C)   Resp 18   Ht 5' 2" (1.575 m)   Wt 69.4 kg (153 lb)   SpO2 95%   BMI 27.98 kg/m²     Physical Exam  Constitutional:       General: She is not in acute distress. Appearance: She is well-developed. She is not diaphoretic. HENT:      Head: Normocephalic and atraumatic. Right Ear: External ear normal.      Left Ear: External ear normal.      Nose: Nose normal.   Eyes:      Pupils: Pupils are equal, round, and reactive to light. Neck:      Thyroid: No thyromegaly. Vascular: No JVD. Cardiovascular:      Rate and Rhythm: Regular rhythm. Heart sounds: No murmur heard. No friction rub. No gallop. Pulmonary:      Effort: Pulmonary effort is normal.      Breath sounds: Normal breath sounds. No wheezing or rales. Abdominal:      General: Bowel sounds are normal. There is no distension. Palpations: Abdomen is soft. Tenderness: There is no abdominal tenderness. Musculoskeletal:         General: Normal range of motion. Cervical back: Normal range of motion and neck supple. Skin:     General: Skin is warm and dry. Findings: No rash. Neurological:      Mental Status: She is alert and oriented to person, place, and time. Cranial Nerves: No cranial nerve deficit.       Sensory: No sensory deficit. Motor: No abnormal muscle tone. Coordination: Coordination normal.      Deep Tendon Reflexes: Reflexes normal.   Psychiatric:         Mood and Affect: Mood normal.         Behavior: Behavior normal.         Thought Content:  Thought content normal.         Judgment: Judgment normal.          Tan Mccormick MD

## 2023-09-26 LAB — COLOGUARD RESULT REPORTABLE: NEGATIVE

## 2024-01-15 ENCOUNTER — APPOINTMENT (OUTPATIENT)
Dept: LAB | Facility: HOSPITAL | Age: 69
End: 2024-01-15
Payer: MEDICARE

## 2024-01-15 DIAGNOSIS — E78.2 MIXED HYPERLIPIDEMIA: ICD-10-CM

## 2024-01-15 LAB
ALBUMIN SERPL BCP-MCNC: 4.3 G/DL (ref 3.5–5)
ALP SERPL-CCNC: 57 U/L (ref 34–104)
ALT SERPL W P-5'-P-CCNC: 17 U/L (ref 7–52)
ANION GAP SERPL CALCULATED.3IONS-SCNC: 6 MMOL/L
AST SERPL W P-5'-P-CCNC: 19 U/L (ref 13–39)
BASOPHILS # BLD AUTO: 0.03 THOUSANDS/ÂΜL (ref 0–0.1)
BASOPHILS NFR BLD AUTO: 1 % (ref 0–1)
BILIRUB SERPL-MCNC: 0.87 MG/DL (ref 0.2–1)
BUN SERPL-MCNC: 13 MG/DL (ref 5–25)
CALCIUM SERPL-MCNC: 9.2 MG/DL (ref 8.4–10.2)
CHLORIDE SERPL-SCNC: 100 MMOL/L (ref 96–108)
CHOLEST SERPL-MCNC: 269 MG/DL
CO2 SERPL-SCNC: 28 MMOL/L (ref 21–32)
CREAT SERPL-MCNC: 0.64 MG/DL (ref 0.6–1.3)
EOSINOPHIL # BLD AUTO: 0.05 THOUSAND/ÂΜL (ref 0–0.61)
EOSINOPHIL NFR BLD AUTO: 1 % (ref 0–6)
ERYTHROCYTE [DISTWIDTH] IN BLOOD BY AUTOMATED COUNT: 12.7 % (ref 11.6–15.1)
GFR SERPL CREATININE-BSD FRML MDRD: 91 ML/MIN/1.73SQ M
GLUCOSE P FAST SERPL-MCNC: 103 MG/DL (ref 65–99)
HCT VFR BLD AUTO: 40.4 % (ref 34.8–46.1)
HDLC SERPL-MCNC: 85 MG/DL
HGB BLD-MCNC: 13.5 G/DL (ref 11.5–15.4)
IMM GRANULOCYTES # BLD AUTO: 0.02 THOUSAND/UL (ref 0–0.2)
IMM GRANULOCYTES NFR BLD AUTO: 0 % (ref 0–2)
LDLC SERPL CALC-MCNC: 166 MG/DL (ref 0–100)
LYMPHOCYTES # BLD AUTO: 1.57 THOUSANDS/ÂΜL (ref 0.6–4.47)
LYMPHOCYTES NFR BLD AUTO: 27 % (ref 14–44)
MCH RBC QN AUTO: 31.5 PG (ref 26.8–34.3)
MCHC RBC AUTO-ENTMCNC: 33.4 G/DL (ref 31.4–37.4)
MCV RBC AUTO: 94 FL (ref 82–98)
MONOCYTES # BLD AUTO: 0.47 THOUSAND/ÂΜL (ref 0.17–1.22)
MONOCYTES NFR BLD AUTO: 8 % (ref 4–12)
NEUTROPHILS # BLD AUTO: 3.66 THOUSANDS/ÂΜL (ref 1.85–7.62)
NEUTS SEG NFR BLD AUTO: 63 % (ref 43–75)
NONHDLC SERPL-MCNC: 184 MG/DL
NRBC BLD AUTO-RTO: 0 /100 WBCS
PLATELET # BLD AUTO: 260 THOUSANDS/UL (ref 149–390)
PMV BLD AUTO: 9.6 FL (ref 8.9–12.7)
POTASSIUM SERPL-SCNC: 3.8 MMOL/L (ref 3.5–5.3)
PROT SERPL-MCNC: 6.8 G/DL (ref 6.4–8.4)
RBC # BLD AUTO: 4.29 MILLION/UL (ref 3.81–5.12)
SODIUM SERPL-SCNC: 134 MMOL/L (ref 135–147)
TRIGL SERPL-MCNC: 90 MG/DL
WBC # BLD AUTO: 5.8 THOUSAND/UL (ref 4.31–10.16)

## 2024-01-15 PROCEDURE — 80053 COMPREHEN METABOLIC PANEL: CPT

## 2024-01-15 PROCEDURE — 85025 COMPLETE CBC W/AUTO DIFF WBC: CPT

## 2024-01-15 PROCEDURE — 36415 COLL VENOUS BLD VENIPUNCTURE: CPT

## 2024-01-15 PROCEDURE — 80061 LIPID PANEL: CPT

## 2024-01-29 ENCOUNTER — TELEMEDICINE (OUTPATIENT)
Dept: FAMILY MEDICINE CLINIC | Facility: CLINIC | Age: 69
End: 2024-01-29
Payer: MEDICARE

## 2024-01-29 DIAGNOSIS — U07.1 COVID-19: Primary | ICD-10-CM

## 2024-01-29 PROCEDURE — 99213 OFFICE O/P EST LOW 20 MIN: CPT | Performed by: FAMILY MEDICINE

## 2024-01-29 NOTE — PROGRESS NOTES
COVID-19 Outpatient Progress Note    Assessment/Plan:    Problem List Items Addressed This Visit    None  Visit Diagnoses       COVID-19    -  Primary        Discussed Paxlovid and side effects with Amy. She would like to hold off for now, but if she changes her mind by Wednesday 1/31/24, she will let me know and I can send it to her pharmacy.      Disposition:     Patient with asymptomatic/mild COVID-19: They were recommended to isolate for at least 5 days (day 0 is the day symptoms appeared or the date the specimen was collected for the positive test for people who are asymptomatic). If they are asymptomatic or symptoms are improving with no fevers in the past 24 hours, isolation may be ended followed by 5 days of wearing a high quality mask when around others to minimize risk of infecting others. They should wear a mask through day 10 and a test-based strategy may be used to remove a mask sooner.      Discussed symptom directed medication options with patient. Discussed vitamin D, vitamin C, and/or zinc supplementation with patient.     I have spent a total time of 15 minutes on the day of the encounter for this patient including       Encounter provider: Angelique Roman MD     Provider located at: [facility]  [address]  [facility] [address]     Recent Visits  No visits were found meeting these conditions.  Showing recent visits within past 7 days and meeting all other requirements  Future Appointments  No visits were found meeting these conditions.  Showing future appointments within next 150 days and meeting all other requirements     This virtual check-in was done via [employer] and patient was informed that this is a secure, HIPAA-compliant platform. She agrees to proceed.    Patient agrees to participate in a virtual check in via telephone or video visit instead of presenting to the office to address urgent/immediate medical needs. Patient is aware this is a  billable service. She acknowledged consent and understanding of privacy and security of the video platform. The patient has agreed to participate and understands they can discontinue the visit at any time.    After connecting through [employer], the patient was identified by name and date of birth. Amy Pratt was informed that this was a telemedicine visit and that the exam was being conducted confidentially over secure lines. My office door was closed. No one else was in the room. Amy Pratt acknowledged consent and understanding of privacy and security of the telemedicine visit. I informed the patient that I have reviewed her record in Epic and presented the opportunity for her to ask any questions regarding the visit today. The patient agreed to participate.     Verification of patient location:  Patient is located in the following state in which I hold an active license: [place]    Subjective:   Amy Pratt is a 68 y.o. female who has been screened for COVID-19. Symptom change since last report: improving. Patient's symptoms include chills, fatigue, malaise, nasal congestion, rhinorrhea, sore throat, anosmia, loss of taste, cough, nausea and headache. Patient denies fever, shortness of breath, chest tightness, abdominal pain, vomiting, diarrhea and myalgias.     - Date of symptom onset: 1/27/2024  - Date of positive COVID-19 test: 1/28/2024. Type of test: Home antigen. Patient with typical symptoms of COVID-19 and they attest that they were positive on home rapid antigen testing. Image of positive result is not able to be uploaded into their chart.     COVID-19 vaccination status: Fully vaccinated (primary series)    Amy has been staying home and has isolated themselves in her home. She is taking care to not share personal items and is cleaning all surfaces that are touched often, like counters, tabletops, and doorknobs using household cleaning sprays or wipes. She is wearing a mask when she leaves her  room.     Lab Results   Component Value Date    SARSCOV2 Negative 10/17/2021       Review of Systems   Constitutional:  Positive for chills and fatigue. Negative for activity change, appetite change, diaphoresis and fever.   HENT:  Positive for congestion, rhinorrhea and sore throat.    Eyes: Negative.    Respiratory:  Positive for cough. Negative for choking, chest tightness, shortness of breath and wheezing.    Cardiovascular: Negative.  Negative for chest pain, palpitations and leg swelling.   Gastrointestinal:  Positive for nausea. Negative for abdominal distention, abdominal pain, constipation, diarrhea and vomiting.   Endocrine: Negative.    Genitourinary: Negative.  Negative for difficulty urinating, dyspareunia, dysuria, enuresis, flank pain, frequency, menstrual problem, pelvic pain and urgency.   Musculoskeletal: Negative.  Negative for arthralgias, back pain, gait problem, joint swelling, myalgias, neck pain and neck stiffness.   Skin: Negative.    Allergic/Immunologic: Negative.    Neurological:  Positive for headaches. Negative for dizziness, tremors, syncope, facial asymmetry, weakness, light-headedness and numbness.   Hematological: Negative.    Psychiatric/Behavioral: Negative.       Current Outpatient Medications on File Prior to Visit   Medication Sig    Ascorbic Acid (vitamin C) 100 MG tablet Take 100 mg by mouth daily    calcium citrate-vitamin D (CITRACAL+D) 315-200 MG-UNIT per tablet Take 1 tablet by mouth 2 (two) times a day    escitalopram (LEXAPRO) 20 mg tablet Take 1 tablet (20 mg total) by mouth daily    fluticasone (FLONASE) 50 mcg/act nasal spray 1 spray into each nostril daily    Garlic 10 MG CAPS Take by mouth in the morning    ibuprofen (MOTRIN) 200 mg tablet Take by mouth every 6 (six) hours as needed for mild pain    Multiple Vitamin (MULTIVITAMIN) tablet Take 1 tablet by mouth daily      Probiotic Product (PROBIOTIC-10 PO) Take by mouth    VITAMIN D, CHOLECALCIFEROL, PO Take by  mouth    [DISCONTINUED] diazepam (VALIUM) 2 mg tablet Take 1 tablet (2 mg total) by mouth every 6 (six) hours as needed for anxiety Evening before and morning of procedure (Patient taking differently: Take 2 mg by mouth every 6 (six) hours as needed for anxiety 1/2 daily )       Objective:    There were no vitals taken for this visit.       Physical Exam  Constitutional:       General: She is not in acute distress.     Appearance: Normal appearance. She is not ill-appearing, toxic-appearing or diaphoretic.   HENT:      Head: Normocephalic and atraumatic.   Eyes:      General:         Right eye: No discharge.         Left eye: No discharge.      Conjunctiva/sclera: Conjunctivae normal.   Pulmonary:      Effort: Pulmonary effort is normal.   Neurological:      Mental Status: She is alert.   Psychiatric:         Mood and Affect: Mood normal.         Behavior: Behavior normal.         Thought Content: Thought content normal.         Judgment: Judgment normal.       Angelique Roman MD

## 2024-08-20 ENCOUNTER — HOSPITAL ENCOUNTER (OUTPATIENT)
Dept: RADIOLOGY | Facility: HOSPITAL | Age: 69
Discharge: HOME | End: 2024-08-20
Attending: INTERNAL MEDICINE
Payer: MEDICARE

## 2024-08-20 VITALS — HEIGHT: 62 IN | BODY MASS INDEX: 28.71 KG/M2 | WEIGHT: 156 LBS

## 2024-08-20 DIAGNOSIS — Z12.39 SCREENING BREAST EXAMINATION: ICD-10-CM

## 2024-08-20 DIAGNOSIS — Z12.31 ENCOUNTER FOR SCREENING MAMMOGRAM FOR MALIGNANT NEOPLASM OF BREAST: ICD-10-CM

## 2024-08-20 PROCEDURE — 77067 SCR MAMMO BI INCL CAD: CPT

## 2024-08-20 PROCEDURE — 77063 BREAST TOMOSYNTHESIS BI: CPT

## 2024-08-26 ENCOUNTER — HOSPITAL ENCOUNTER (OUTPATIENT)
Dept: ULTRASOUND IMAGING | Facility: CLINIC | Age: 69
Discharge: HOME | End: 2024-08-26
Payer: MEDICARE

## 2024-08-26 ENCOUNTER — HOSPITAL ENCOUNTER (OUTPATIENT)
Dept: MAMMOGRAPHY | Facility: CLINIC | Age: 69
Discharge: HOME | End: 2024-08-26
Payer: MEDICARE

## 2024-08-26 DIAGNOSIS — R92.8 ABNORMAL MAMMOGRAM: ICD-10-CM

## 2024-08-26 PROCEDURE — 76642 ULTRASOUND BREAST LIMITED: CPT

## 2024-08-26 PROCEDURE — G0279 TOMOSYNTHESIS, MAMMO: HCPCS

## 2024-08-26 PROCEDURE — 77065 DX MAMMO INCL CAD UNI: CPT

## 2024-10-28 ENCOUNTER — OFFICE VISIT (OUTPATIENT)
Dept: FAMILY MEDICINE CLINIC | Facility: CLINIC | Age: 69
End: 2024-10-28
Payer: MEDICARE

## 2024-10-28 VITALS
SYSTOLIC BLOOD PRESSURE: 120 MMHG | WEIGHT: 159.6 LBS | HEART RATE: 76 BPM | RESPIRATION RATE: 18 BRPM | BODY MASS INDEX: 29.37 KG/M2 | DIASTOLIC BLOOD PRESSURE: 76 MMHG | HEIGHT: 62 IN | TEMPERATURE: 96.8 F

## 2024-10-28 DIAGNOSIS — H10.9 CONJUNCTIVITIS OF LEFT EYE, UNSPECIFIED CONJUNCTIVITIS TYPE: Primary | ICD-10-CM

## 2024-10-28 PROCEDURE — 99213 OFFICE O/P EST LOW 20 MIN: CPT | Performed by: FAMILY MEDICINE

## 2024-10-28 PROCEDURE — G2211 COMPLEX E/M VISIT ADD ON: HCPCS | Performed by: FAMILY MEDICINE

## 2024-10-28 RX ORDER — MOXIFLOXACIN 5 MG/ML
1 SOLUTION/ DROPS OPHTHALMIC 3 TIMES DAILY
Qty: 3 ML | Refills: 0 | Status: SHIPPED | OUTPATIENT
Start: 2024-10-28 | End: 2024-11-04

## 2024-10-28 NOTE — PROGRESS NOTES
"Ambulatory Visit  Name: Amy Pratt      : 1955      MRN: [id number]  Encounter Provider: Angelique Roman MD  Encounter Date: 10/28/2024   Encounter department: [facility]    Assessment & Plan  Conjunctivitis of left eye, unspecified conjunctivitis type    Orders:    moxifloxacin (VIGAMOX) 0.5 % ophthalmic solution; Administer 1 drop to the right eye 3 (three) times a day for 7 days       History of Present Illness     HPI  Conjunctivitis  Patient presents for evaluation of erythema, foreign body sensation, and pain in the left eye. She has noticed the above symptoms for 1 week. Onset was sudden. Patient denies blurred vision, discharge, itching, tearing, and visual field deficit. There is a history of  no eye issues .    Review of Systems        Objective     /76   Pulse 76   Temp (!) 96.8 °F (36 °C)   Resp 18   Ht 5' 2\" (1.575 m)   Wt 72.4 kg (159 lb 9.6 oz)   BMI 29.19 kg/m²     Physical Exam  Constitutional:       General: She is not in acute distress.     Appearance: Normal appearance. She is not ill-appearing, toxic-appearing or diaphoretic.   HENT:      Head: Normocephalic and atraumatic.   Eyes:      General: No scleral icterus.        Right eye: No discharge.         Left eye: No foreign body, discharge or hordeolum.      Extraocular Movements: Extraocular movements intact.      Conjunctiva/sclera:      Left eye: Left conjunctiva is injected.      Pupils: Pupils are equal, round, and reactive to light.   Pulmonary:      Effort: Pulmonary effort is normal.   Neurological:      Mental Status: She is alert.   Psychiatric:         Mood and Affect: Mood normal.         Behavior: Behavior normal.         Thought Content: Thought content normal.         Judgment: Judgment normal.         "

## 2024-11-08 ENCOUNTER — APPOINTMENT (OUTPATIENT)
Dept: LAB | Facility: HOSPITAL | Age: 69
End: 2024-11-08
Payer: MEDICARE

## 2024-11-08 DIAGNOSIS — H20.9 UNSPECIFIED IRIDOCYCLITIS: ICD-10-CM

## 2024-11-08 LAB
ANA SER QL IA: NEGATIVE
BASOPHILS # BLD AUTO: 0.03 THOUSANDS/ÂΜL (ref 0–0.1)
BASOPHILS NFR BLD AUTO: 0 % (ref 0–1)
CRP SERPL QL: 2.1 MG/L
EOSINOPHIL # BLD AUTO: 0.1 THOUSAND/ÂΜL (ref 0–0.61)
EOSINOPHIL NFR BLD AUTO: 1 % (ref 0–6)
ERYTHROCYTE [DISTWIDTH] IN BLOOD BY AUTOMATED COUNT: 12.4 % (ref 11.6–15.1)
ERYTHROCYTE [SEDIMENTATION RATE] IN BLOOD: 23 MM/HOUR (ref 0–29)
HCT VFR BLD AUTO: 41.1 % (ref 34.8–46.1)
HGB BLD-MCNC: 13.2 G/DL (ref 11.5–15.4)
IMM GRANULOCYTES # BLD AUTO: 0.01 THOUSAND/UL (ref 0–0.2)
IMM GRANULOCYTES NFR BLD AUTO: 0 % (ref 0–2)
LYMPHOCYTES # BLD AUTO: 1.88 THOUSANDS/ÂΜL (ref 0.6–4.47)
LYMPHOCYTES NFR BLD AUTO: 25 % (ref 14–44)
MCH RBC QN AUTO: 30.1 PG (ref 26.8–34.3)
MCHC RBC AUTO-ENTMCNC: 32.1 G/DL (ref 31.4–37.4)
MCV RBC AUTO: 94 FL (ref 82–98)
MONOCYTES # BLD AUTO: 0.51 THOUSAND/ÂΜL (ref 0.17–1.22)
MONOCYTES NFR BLD AUTO: 7 % (ref 4–12)
NEUTROPHILS # BLD AUTO: 4.97 THOUSANDS/ÂΜL (ref 1.85–7.62)
NEUTS SEG NFR BLD AUTO: 67 % (ref 43–75)
NRBC BLD AUTO-RTO: 0 /100 WBCS
PLATELET # BLD AUTO: 339 THOUSANDS/UL (ref 149–390)
PMV BLD AUTO: 9.6 FL (ref 8.9–12.7)
RBC # BLD AUTO: 4.38 MILLION/UL (ref 3.81–5.12)
TREPONEMA PALLIDUM IGG+IGM AB [PRESENCE] IN SERUM OR PLASMA BY IMMUNOASSAY: NORMAL
WBC # BLD AUTO: 7.5 THOUSAND/UL (ref 4.31–10.16)

## 2024-11-08 PROCEDURE — 36415 COLL VENOUS BLD VENIPUNCTURE: CPT

## 2024-11-08 PROCEDURE — 82164 ANGIOTENSIN I ENZYME TEST: CPT

## 2024-11-08 PROCEDURE — 86038 ANTINUCLEAR ANTIBODIES: CPT

## 2024-11-08 PROCEDURE — 86780 TREPONEMA PALLIDUM: CPT

## 2024-11-08 PROCEDURE — 85652 RBC SED RATE AUTOMATED: CPT

## 2024-11-08 PROCEDURE — 85025 COMPLETE CBC W/AUTO DIFF WBC: CPT

## 2024-11-08 PROCEDURE — 86140 C-REACTIVE PROTEIN: CPT

## 2024-11-09 LAB — ACE SERPL-CCNC: 22 U/L (ref 14–82)

## 2024-11-17 LAB — HLA-B27 QL NAA+PROBE: NORMAL

## 2024-12-14 NOTE — TELEPHONE ENCOUNTER
I switched her to duloxetine 30 mg one tablet daily   NIC [facility]  MEDICAL ICU HISTORY AND PHYSICAL EXAM      Patient Name: Marie Plascencia    : 1973  MRN: [id number]  Admit Date: 2024   Length of Stay: 0 Days    Subjective  Marie Plascencia is a 51 year old male with a past medical history of ALS, chronic constipation, trach and PEG dependence. Admitted to MICU for for respiratory distress.  Patient presents to ED accompanied by his wife.  All information obtained by wife.  Starting Thursday patient started to become more tachypneic with increased work of breathing and hypoxia they trialed him on full vent support.  However, he was unable to tolerate secondary to discomfort, nausea, and vomiting.  They placed him on a pressure support.  Which they did notice a slight improvement in work of breathing.  Of note, at night he is placed on the vent on pressure support.  They do have 24-hour care.  The nurse instructed them to come to the ED for worsening respiratory status.  Wife states patient always has tan secretions.  Has not noticed an increase in secretions or a change in color.  Upon exam in the ED. patient is tachypneic in the 40s visual respiratory distress.  Also, tachycardic in the 130s.  Plan to admit to the ICU.  Trial on full vent support.  If patient is unable to tolerate full vent support we will then place him on pressure support this will help his work of breathing.  Wife updated at bedside agreeable to plan.      COVID status: Pending Rapid PCR: Pending       Past Medical, Surgical, Family and Social History  Patient has a past medical history of ALS (amyotrophic lateral sclerosis)  (CMD).    Patient has no past surgical history on file.    Patient family history is not on file.    Patient reports that he has never smoked. He has never used smokeless tobacco. He reports that he does not currently use alcohol. He reports that he does not currently use drugs.    Allergies  Patient has No Known Allergies.    Physical  Exam  General: Critically il, respiratory distress   Neuro: Pupils 3 mm and reactive on the right and 3 mm and reactive on the left. Contracted, non-verbal   HEENT: Trached   Neck: No JVP or lymphadenopathy  Lungs: Coarse breath sounds bilaterally, tachypnea   Cardio:  Sinus tachycardia   Abdomen: PEG tube in place, distended but soft, non-tender to palpation   Skin: Warm. No rashes.  Extremities:  2+ radial/pedal pulses equal bilaterally.      Pertinent Labs/Imaging  WBC 16  ProCal 1.47  CTA: bibasilar consolidations extensive perihilar patchy consolidations concerning for multifocal, bilateral pneumonia.     Assessment and Plan:  51 year old male with a past medical history of ALS, chronic constipation, trach and PEG dependence. Admitted to MICU for for hypoxic respiratory failure     Neuro/Psych  ALS  Continue baclofen,   Home med not on formulary riluzole. Will need to be brought in   Pulm  Acute on chronic hypoxic respiratory failure likely in the setting of aspiration pneumonia; CTA concerning for bilateral pneumonia   Home settings: Trach collar during the day & PS at night.  Will place patient on PS. If no improvement in respiratory will trial on full vent support  Infectious plan below  Duo nebs  Pulm hygiene   CV  Tachycardia  CTA negative for PE   Most likely in the setting of SIRS response, will improve with supportive care.  Point-of-care ultrasound difficult views however, parasternal long axis with small RV     GI  GERD  Continue PPI   Hx of constipation  Continue bowel management   PRN enema     Renal/Metabolic  No active ICU issues   Heme/Onc  No active ICU issues   Infectious Disease  Aspiration pneumonia, POA  Continue vancomycin and Cefepime for now.  Check MRSA nares, urine Legionella and urine strep.  Check sputum culture  Follow culture results, trend fever curve and WBC count.  Integument/MSK/Miscellaneous  Nutrition status: Peg dependent. Nutrition consult..       Sepsis  Documentation  Severe Sepsis/Septic Shock Identified?  NO   Repeat Lactate Ordered? No, Not applicable. Reason: Negative ,   Blood Ctx Ordered? Yes         Abx Ordered? Yes  Is Lactate >/= 4 or Hypotension (SBP<90, MAP<65 x2 in 3hrs) Present?   No        Sedation/analgesia: Acetaminophen as needed  DVT prophylaxis: Enoxaparin and SCDs  Electrolytes: unless specified above replace as needed  Lines/drains/tubes: PIV, tracheostomy, PEG  Bowel Regimen: colace, senna  Ulcer prophylaxis: PPI  Nutrition: N.p.o. for now  Glucose control: Glucose checks as needed  Therapies: PT/OT/ST when appropriate  Code Status: FULL    I have spent greater than 45 min performing face-to-face patient care, including rendering the following critical care: Reviewed all vitals, medications, new orders, I/O, labs, micro, radiology, nurses notes, pertinent consultant notes which are reflected in assessment and plan. Patient is critically ill as documented above and requires high complexity medical decision making and active titration of therapies to preserve life.     Swetha Cruz, CNP  Critical Care

## 2025-01-22 ENCOUNTER — OFFICE VISIT (OUTPATIENT)
Dept: FAMILY MEDICINE CLINIC | Facility: CLINIC | Age: 70
End: 2025-01-22
Payer: MEDICARE

## 2025-01-22 VITALS
HEIGHT: 62 IN | SYSTOLIC BLOOD PRESSURE: 122 MMHG | WEIGHT: 154.6 LBS | RESPIRATION RATE: 16 BRPM | TEMPERATURE: 97.7 F | HEART RATE: 72 BPM | BODY MASS INDEX: 28.45 KG/M2 | DIASTOLIC BLOOD PRESSURE: 78 MMHG

## 2025-01-22 DIAGNOSIS — F33.9 DEPRESSION, RECURRENT (HCC): ICD-10-CM

## 2025-01-22 DIAGNOSIS — Z00.00 MEDICARE ANNUAL WELLNESS VISIT, SUBSEQUENT: Primary | ICD-10-CM

## 2025-01-22 PROCEDURE — G0439 PPPS, SUBSEQ VISIT: HCPCS | Performed by: INTERNAL MEDICINE

## 2025-01-22 RX ORDER — ESCITALOPRAM OXALATE 5 MG/1
5 TABLET ORAL DAILY
COMMUNITY

## 2025-01-22 NOTE — PATIENT INSTRUCTIONS
Medicare Preventive Visit Patient Instructions  Thank you for completing your Welcome to Medicare Visit or Medicare Annual Wellness Visit today. Your next wellness visit will be due in one year (1/23/2026).  The screening/preventive services that you may require over the next 5-10 years are detailed below. Some tests may not apply to you based off risk factors and/or age. Screening tests ordered at today's visit but not completed yet may show as past due. Also, please note that scanned in results may not display below.  Preventive Screenings:  Service Recommendations Previous Testing/Comments   Colorectal Cancer Screening  * Colonoscopy    * Fecal Occult Blood Test (FOBT)/Fecal Immunochemical Test (FIT)  * Fecal DNA/Cologuard Test  * Flexible Sigmoidoscopy Age: 45-75 years old   Colonoscopy: every 10 years (may be performed more frequently if at higher risk)  OR  FOBT/FIT: every 1 year  OR  Cologuard: every 3 years  OR  Sigmoidoscopy: every 5 years  Screening may be recommended earlier than age 45 if at higher risk for colorectal cancer. Also, an individualized decision between you and your healthcare provider will decide whether screening between the ages of 76-85 would be appropriate. Colonoscopy: Not on file  FOBT/FIT: Not on file  Cologuard: 09/19/2023  Sigmoidoscopy: Not on file    Screening Current     Breast Cancer Screening Age: 40+ years old  Frequency: every 1-2 years  Not required if history of left and right mastectomy Mammogram: 08/20/2024    Screening Current   Cervical Cancer Screening Between the ages of 21-29, pap smear recommended once every 3 years.   Between the ages of 30-65, can perform pap smear with HPV co-testing every 5 years.   Recommendations may differ for women with a history of total hysterectomy, cervical cancer, or abnormal pap smears in past. Pap Smear: Not on file    Screening Not Indicated   Hepatitis C Screening Once for adults born between 1945 and 1965  More frequently in  patients at high risk for Hepatitis C Hep C Antibody: 10/12/2019    Screening Current   Diabetes Screening 1-2 times per year if you're at risk for diabetes or have pre-diabetes Fasting glucose: 103 mg/dL (1/15/2024)  A1C: 5.5 % (11/29/2021)      Cholesterol Screening Once every 5 years if you don't have a lipid disorder. May order more often based on risk factors. Lipid panel: 01/15/2024    Screening Not Indicated  History Lipid Disorder     Other Preventive Screenings Covered by Medicare:  Abdominal Aortic Aneurysm (AAA) Screening: covered once if your at risk. You're considered to be at risk if you have a family history of AAA.  Lung Cancer Screening: covers low dose CT scan once per year if you meet all of the following conditions: (1) Age 55-77; (2) No signs or symptoms of lung cancer; (3) Current smoker or have quit smoking within the last 15 years; (4) You have a tobacco smoking history of at least 20 pack years (packs per day multiplied by number of years you smoked); (5) You get a written order from a healthcare provider.  Glaucoma Screening: covered annually if you're considered high risk: (1) You have diabetes OR (2) Family history of glaucoma OR (3)  aged 50 and older OR (4)  American aged 65 and older  Osteoporosis Screening: covered every 2 years if you meet one of the following conditions: (1) You're estrogen deficient and at risk for osteoporosis based off medical history and other findings; (2) Have a vertebral abnormality; (3) On glucocorticoid therapy for more than 3 months; (4) Have primary hyperparathyroidism; (5) On osteoporosis medications and need to assess response to drug therapy.   Last bone density test (DXA Scan): 07/21/2020.  HIV Screening: covered annually if you're between the age of 15-65. Also covered annually if you are younger than 15 and older than 65 with risk factors for HIV infection. For pregnant patients, it is covered up to 3 times per  pregnancy.    Immunizations:  Immunization Recommendations   Influenza Vaccine Annual influenza vaccination during flu season is recommended for all persons aged >= 6 months who do not have contraindications   Pneumococcal Vaccine   * Pneumococcal conjugate vaccine = PCV13 (Prevnar 13), PCV15 (Vaxneuvance), PCV20 (Prevnar 20)  * Pneumococcal polysaccharide vaccine = PPSV23 (Pneumovax) Adults 19-63 yo with certain risk factors or if 65+ yo  If never received any pneumonia vaccine: recommend Prevnar 20 (PCV20)  Give PCV20 if previously received 1 dose of PCV13 or PPSV23   Hepatitis B Vaccine 3 dose series if at intermediate or high risk (ex: diabetes, end stage renal disease, liver disease)   Respiratory syncytial virus (RSV) Vaccine - COVERED BY MEDICARE PART D  * RSVPreF3 (Arexvy) CDC recommends that adults 60 years of age and older may receive a single dose of RSV vaccine using shared clinical decision-making (SCDM)   Tetanus (Td) Vaccine - COST NOT COVERED BY MEDICARE PART B Following completion of primary series, a booster dose should be given every 10 years to maintain immunity against tetanus. Td may also be given as tetanus wound prophylaxis.   Tdap Vaccine - COST NOT COVERED BY MEDICARE PART B Recommended at least once for all adults. For pregnant patients, recommended with each pregnancy.   Shingles Vaccine (Shingrix) - COST NOT COVERED BY MEDICARE PART B  2 shot series recommended in those 19 years and older who have or will have weakened immune systems or those 50 years and older     Health Maintenance Due:      Topic Date Due   • Breast Cancer Screening: Mammogram  08/20/2025   • Colorectal Cancer Screening  09/19/2026   • Hepatitis C Screening  Completed     Immunizations Due:      Topic Date Due   • Pneumococcal Vaccine: 65+ Years (1 of 1 - PCV) Never done   • Influenza Vaccine (1) 09/01/2024   • COVID-19 Vaccine (1 - 2024-25 season) Never done     Advance Directives   What are advance directives?   Advance directives are legal documents that state your wishes and plans for medical care. These plans are made ahead of time in case you lose your ability to make decisions for yourself. Advance directives can apply to any medical decision, such as the treatments you want, and if you want to donate organs.   What are the types of advance directives?  There are many types of advance directives, and each state has rules about how to use them. You may choose a combination of any of the following:  Living will:  This is a written record of the treatment you want. You can also choose which treatments you do not want, which to limit, and which to stop at a certain time. This includes surgery, medicine, IV fluid, and tube feedings.   Durable power of  for healthcare (DPAHC):  This is a written record that states who you want to make healthcare choices for you when you are unable to make them for yourself. This person, called a proxy, is usually a family member or a friend. You may choose more than 1 proxy.  Do not resuscitate (DNR) order:  A DNR order is used in case your heart stops beating or you stop breathing. It is a request not to have certain forms of treatment, such as CPR. A DNR order may be included in other types of advance directives.  Medical directive:  This covers the care that you want if you are in a coma, near death, or unable to make decisions for yourself. You can list the treatments you want for each condition. Treatment may include pain medicine, surgery, blood transfusions, dialysis, IV or tube feedings, and a ventilator (breathing machine).  Values history:  This document has questions about your views, beliefs, and how you feel and think about life. This information can help others choose the care that you would choose.  Why are advance directives important?  An advance directive helps you control your care. Although spoken wishes may be used, it is better to have your wishes written down.  Spoken wishes can be misunderstood, or not followed. Treatments may be given even if you do not want them. An advance directive may make it easier for your family to make difficult choices about your care.   Weight Management   Why it is important to manage your weight:  Being overweight increases your risk of health conditions such as heart disease, high blood pressure, type 2 diabetes, and certain types of cancer. It can also increase your risk for osteoarthritis, sleep apnea, and other respiratory problems. Aim for a slow, steady weight loss. Even a small amount of weight loss can lower your risk of health problems.  How to lose weight safely:  A safe and healthy way to lose weight is to eat fewer calories and get regular exercise. You can lose up about 1 pound a week by decreasing the number of calories you eat by 500 calories each day.   Healthy meal plan for weight management:  A healthy meal plan includes a variety of foods, contains fewer calories, and helps you stay healthy. A healthy meal plan includes the following:  Eat whole-grain foods more often.  A healthy meal plan should contain fiber. Fiber is the part of grains, fruits, and vegetables that is not broken down by your body. Whole-grain foods are healthy and provide extra fiber in your diet. Some examples of whole-grain foods are whole-wheat breads and pastas, oatmeal, brown rice, and bulgur.  Eat a variety of vegetables every day.  Include dark, leafy greens such as spinach, kale, toño greens, and mustard greens. Eat yellow and orange vegetables such as carrots, sweet potatoes, and winter squash.   Eat a variety of fruits every day.  Choose fresh or canned fruit (canned in its own juice or light syrup) instead of juice. Fruit juice has very little or no fiber.  Eat low-fat dairy foods.  Drink fat-free (skim) milk or 1% milk. Eat fat-free yogurt and low-fat cottage cheese. Try low-fat cheeses such as mozzarella and other reduced-fat  cheeses.  Choose meat and other protein foods that are low in fat.  Choose beans or other legumes such as split peas or lentils. Choose fish, skinless poultry (chicken or turkey), or lean cuts of red meat (beef or pork). Before you cook meat or poultry, cut off any visible fat.   Use less fat and oil.  Try baking foods instead of frying them. Add less fat, such as margarine, sour cream, regular salad dressing and mayonnaise to foods. Eat fewer high-fat foods. Some examples of high-fat foods include french fries, doughnuts, ice cream, and cakes.  Eat fewer sweets.  Limit foods and drinks that are high in sugar. This includes candy, cookies, regular soda, and sweetened drinks.  Exercise:  Exercise at least 30 minutes per day on most days of the week. Some examples of exercise include walking, biking, dancing, and swimming. You can also fit in more physical activity by taking the stairs instead of the elevator or parking farther away from stores. Ask your healthcare provider about the best exercise plan for you.      © Copyright [employer] 2018 Information is for End User's use only and may not be sold, redistributed or otherwise used for commercial purposes. All illustrations and images included in CareNotes® are the copyrighted property of [employer] Inc[employer] or [employer]

## 2025-01-22 NOTE — PROGRESS NOTES
Name: Amy Pratt      : 1955      MRN: [id number]  Encounter Provider: Amanda Hill MD  Encounter Date: 2025   Encounter department: [facility]    Assessment & Plan  Medicare annual wellness visit, subsequent         Depression, recurrent (HCC)  Managed by psychiatry.          Preventive health issues were discussed with patient, and age appropriate screening tests were ordered as noted in patient's After Visit Summary. Personalized health advice and appropriate referrals for health education or preventive services given if needed, as noted in patient's After Visit Summary.    History of Present Illness     Here for AWV. Feels well.  Continues to see psychiatry for her medications and feels well.        Patient Care Team:  Amanda Hill MD as PCP - General  NIC Landeros MD    Review of Systems   Constitutional:  Negative for chills and fever.   HENT:  Negative for ear pain and sore throat.    Eyes:  Negative for pain and visual disturbance.   Respiratory:  Negative for cough and shortness of breath.    Cardiovascular:  Negative for chest pain and palpitations.   Gastrointestinal:  Negative for abdominal pain and vomiting.   Genitourinary:  Negative for dysuria and hematuria.   Musculoskeletal:  Negative for arthralgias and back pain.   Skin:  Negative for color change and rash.   Neurological:  Negative for seizures and syncope.   All other systems reviewed and are negative.    Medical History Reviewed by provider this encounter:       Annual Wellness Visit Questionnaire   Amy is here for her Subsequent Wellness visit.     Health Risk Assessment:   Patient rates overall health as excellent. Patient feels that their physical health rating is much better. Patient is very satisfied with their life. Eyesight was rated as same. Hearing was rated as same. Patient feels that their emotional and mental health rating is much better. Patients states they are  never, rarely angry. Patient states they are never, rarely unusually tired/fatigued. Pain experienced in the last 7 days has been none. Patient states that she has experienced no weight loss or gain in last 6 months.     Depression Screening:   PHQ-9 Score: 0      Fall Risk Screening:   In the past year, patient has experienced: no history of falling in past year      Urinary Incontinence Screening:   Patient has not leaked urine accidently in the last six months.     Home Safety:  Patient does not have trouble with stairs inside or outside of their home. Patient has working smoke alarms and has working carbon monoxide detector. Home safety hazards include: none.     Nutrition:   Current diet is Regular.     Medications:   Patient is currently taking over-the-counter supplements. OTC medications include: see medication list. Patient is able to manage medications.     Activities of Daily Living (ADLs)/Instrumental Activities of Daily Living (IADLs):   Walk and transfer into and out of bed and chair?: Yes  Dress and groom yourself?: Yes    Bathe or shower yourself?: Yes    Feed yourself? Yes  Do your laundry/housekeeping?: Yes  Manage your money, pay your bills and track your expenses?: Yes  Make your own meals?: Yes    Do your own shopping?: Yes    Previous Hospitalizations:   Any hospitalizations or ED visits within the last 12 months?: No      Advance Care Planning:   Living will: No    Durable POA for healthcare: No      Comments: Declines information.    Cognitive Screening:   Provider or family/friend/caregiver concerned regarding cognition?: No    PREVENTIVE SCREENINGS      Cardiovascular Screening:    General: Screening Not Indicated and History Lipid Disorder      Diabetes Screening:     General: Risks and Benefits Discussed    Due for: Blood Glucose      Colorectal Cancer Screening:     General: Screening Current      Breast Cancer Screening:     General: Screening Current      Cervical Cancer Screening:     General: Screening Not Indicated      Osteoporosis Screening:    General: Patient Declines      Abdominal Aortic Aneurysm (AAA) Screening:        General: Screening Not Indicated      Lung Cancer Screening:     General: Screening Not Indicated      Hepatitis C Screening:    General: Screening Current    Screening, Brief Intervention, and Referral to Treatment (SBIRT)    Screening  Typical number of drinks in a day: 0  Typical number of drinks in a week: 0  Interpretation: Low risk drinking behavior.    Single Item Drug Screening:  How often have you used an illegal drug (including marijuana) or a prescription medication for non-medical reasons in the past year? never    Single Item Drug Screen Score: 0  Interpretation: Negative screen for possible drug use disorder    Brief Intervention  Alcohol & drug use screenings were reviewed. No concerns regarding substance use disorder identified.     Other Counseling Topics:   Car/seat belt/driving safety, skin self-exam, sunscreen and calcium and vitamin D intake and regular weightbearing exercise.     Social Drivers of Health     Financial Resource Strain: Low Risk  (8/30/2023)    Overall Financial Resource Strain (CARDIA)    • Difficulty of Paying Living Expenses: Not hard at all   Food Insecurity: No Food Insecurity (1/22/2025)    Hunger Vital Sign    • Worried About Running Out of Food in the Last Year: Never true    • Ran Out of Food in the Last Year: Never true   Transportation Needs: No Transportation Needs (1/22/2025)    PRAPARE - Transportation    • Lack of Transportation (Medical): No    • Lack of Transportation (Non-Medical): No   Housing Stability: Low Risk  (1/22/2025)    Housing Stability Vital Sign    • Unable to Pay for Housing in the Last Year: No    • Number of Times Moved in the Last Year: 0    • Homeless in the Last Year: No   Utilities: Not At Risk (1/22/2025)    [facility] Utilities    • Threatened with loss of utilities: No     No results found.    Objective  "  /78   Pulse 72   Temp 97.7 °F (36.5 °C)   Resp 16   Ht 5' 2\" (1.575 m)   Wt 70.1 kg (154 lb 9.6 oz)   BMI 28.28 kg/m²     Physical Exam  Constitutional:       General: She is not in acute distress.     Appearance: She is well-developed. She is not diaphoretic.   HENT:      Head: Normocephalic and atraumatic.      Right Ear: External ear normal.      Left Ear: External ear normal.      Nose: Nose normal.   Eyes:      Pupils: Pupils are equal, round, and reactive to light.   Neck:      Thyroid: No thyromegaly.      Vascular: No JVD.   Cardiovascular:      Rate and Rhythm: Regular rhythm.      Heart sounds: No murmur heard.     No friction rub. No gallop.   Pulmonary:      Effort: Pulmonary effort is normal.      Breath sounds: Normal breath sounds. No wheezing or rales.   Abdominal:      General: Bowel sounds are normal. There is no distension.      Palpations: Abdomen is soft.      Tenderness: There is no abdominal tenderness.   Musculoskeletal:         General: Normal range of motion.      Cervical back: Normal range of motion and neck supple.   Skin:     General: Skin is warm and dry.      Findings: No rash.   Neurological:      Mental Status: She is alert and oriented to person, place, and time.      Cranial Nerves: No cranial nerve deficit.      Sensory: No sensory deficit.      Motor: No abnormal muscle tone.      Coordination: Coordination normal.      Deep Tendon Reflexes: Reflexes normal.   Psychiatric:         Behavior: Behavior normal.         Thought Content: Thought content normal.         Judgment: Judgment normal.         "

## 2025-03-04 ENCOUNTER — OFFICE VISIT (OUTPATIENT)
Age: 70
End: 2025-03-04
Payer: MEDICARE

## 2025-03-04 VITALS — BODY MASS INDEX: 28.34 KG/M2 | WEIGHT: 154 LBS | HEIGHT: 62 IN | RESPIRATION RATE: 17 BRPM

## 2025-03-04 DIAGNOSIS — L03.031 PARONYCHIA OF TOENAIL OF RIGHT FOOT: ICD-10-CM

## 2025-03-04 DIAGNOSIS — M79.672 PAIN IN BOTH FEET: Primary | ICD-10-CM

## 2025-03-04 DIAGNOSIS — B35.9 DERMATOPHYTOSIS: ICD-10-CM

## 2025-03-04 DIAGNOSIS — B35.1 ONYCHOMYCOSIS: ICD-10-CM

## 2025-03-04 DIAGNOSIS — M79.671 PAIN IN BOTH FEET: Primary | ICD-10-CM

## 2025-03-04 PROCEDURE — 99203 OFFICE O/P NEW LOW 30 MIN: CPT | Performed by: PODIATRIST

## 2025-03-04 RX ORDER — KETOCONAZOLE 20 MG/G
CREAM TOPICAL DAILY
Qty: 60 G | Refills: 1 | Status: SHIPPED | OUTPATIENT
Start: 2025-03-04 | End: 2025-04-03

## 2025-03-04 RX ORDER — TERBINAFINE HYDROCHLORIDE 250 MG/1
250 TABLET ORAL DAILY
Qty: 30 TABLET | Refills: 0 | Status: SHIPPED | OUTPATIENT
Start: 2025-03-04 | End: 2025-04-03

## 2025-03-04 NOTE — PROGRESS NOTES
Assessment/Plan: Pain upon ambulation.  Dermatophytosis.  Paronychia right hallux.  Mycosis of nail right foot.    Plan.  Chart reviewed.  PCP notes reviewed.  Patient evaluated.  Patient has no elevation of liver function so therefore should be placed on oral terbinafine.  She will also be placed on topical cream.  She will spray shoes with Lysol.  Patient advised a nail cutting technique.  Return 2 months.         Diagnoses and all orders for this visit:    Pain in both feet    Onychomycosis  -     terbinafine (LamISIL) 250 mg tablet; Take 1 tablet (250 mg total) by mouth daily  -     ketoconazole (NIZORAL) 2 % cream; Apply topically daily    Dermatophytosis  -     terbinafine (LamISIL) 250 mg tablet; Take 1 tablet (250 mg total) by mouth daily  -     ketoconazole (NIZORAL) 2 % cream; Apply topically daily    Paronychia of toenail of right foot          Subjective: Patient presents for second opinion.  She is concerned about her toenails.  She also has dry itchy scaly skin of her feet.  This has been ongoing.  No treatment in the past.    No Known Allergies      Current Outpatient Medications:     ketoconazole (NIZORAL) 2 % cream, Apply topically daily, Disp: 60 g, Rfl: 1    terbinafine (LamISIL) 250 mg tablet, Take 1 tablet (250 mg total) by mouth daily, Disp: 30 tablet, Rfl: 0    Ascorbic Acid (vitamin C) 100 MG tablet, Take 100 mg by mouth daily, Disp: , Rfl:     calcium citrate-vitamin D (CITRACAL+D) 315-200 MG-UNIT per tablet, Take 1 tablet by mouth 2 (two) times a day, Disp: , Rfl:     escitalopram (LEXAPRO) 5 mg tablet, Take 5 mg by mouth daily, Disp: , Rfl:     fluticasone (FLONASE) 50 mcg/act nasal spray, 1 spray into each nostril daily (Patient taking differently: 1 spray into each nostril as needed), Disp: , Rfl:     Garlic 10 MG CAPS, Take by mouth in the morning, Disp: , Rfl:     ibuprofen (MOTRIN) 200 mg tablet, Take by mouth every 6 (six) hours as needed for mild pain, Disp: , Rfl:     Multiple  Vitamin (MULTIVITAMIN) tablet, Take 1 tablet by mouth daily  , Disp: , Rfl:     Probiotic Product (PROBIOTIC-10 PO), Take by mouth, Disp: , Rfl:     VITAMIN D, CHOLECALCIFEROL, PO, Take by mouth, Disp: , Rfl:     Patient Active Problem List   Diagnosis    Anxiety disorder    Exophthalmos    Mixed hyperlipidemia    Vertigo    Chronic rhinitis    Hypokalemia    Chronic headaches    TMJ (temporomandibular joint syndrome)    Depression, recurrent (HCC)          Patient ID: Amy Pratt is a 69 y.o. female.    HPI    The following portions of the patient's history were reviewed and updated as appropriate:     family history includes Breast cancer in her maternal grandmother; Heart disease in her mother; Hyperlipidemia in her mother; Hypothyroidism in her mother; No Known Problems in her daughter, father, sister, sister, sister, sister, and son.      reports that she has never smoked. She has never been exposed to tobacco smoke. She has never used smokeless tobacco. She reports that she does not currently use alcohol. She reports that she does not use drugs.    Vitals:    03/04/25 1302   Resp: 17       Review of Systems      Objective:  Patient's shoes and socks removed.   Foot Exam    General  General Appearance: appears stated age and healthy   Orientation: alert and oriented to person, place, and time   Affect: appropriate       Right Foot/Ankle     Inspection and Palpation  Swelling: none   Arch: pes planus  Skin Exam: dry skin and tinea;     Neurovascular  Dorsalis pedis: 2+  Posterior tibial: 2+      Left Foot/Ankle      Inspection and Palpation  Swelling: none   Arch: pes planus  Skin Exam: dry skin and tinea;     Neurovascular  Dorsalis pedis: 2+  Posterior tibial: 2+      Physical Exam  Vitals and nursing note reviewed.   Constitutional:       Appearance: Normal appearance.   Cardiovascular:      Rate and Rhythm: Normal rate and regular rhythm.      Pulses:           Dorsalis pedis pulses are 2+ on the right  side and 2+ on the left side.        Posterior tibial pulses are 2+ on the right side and 2+ on the left side.   Feet:      Right foot:      Skin integrity: Dry skin present.      Left foot:      Skin integrity: Dry skin present.   Skin:     Comments: Patient has dystrophy of all nails.  Nails 1 3-5 of the right foot demonstrate distal mycosis with secondary lysis.  Paronychia right hallux.  Negative pus.   Neurological:      Mental Status: She is alert.   Psychiatric:         Mood and Affect: Mood normal.         Behavior: Behavior normal.         Thought Content: Thought content normal.         Judgment: Judgment normal.

## 2025-05-20 ENCOUNTER — OFFICE VISIT (OUTPATIENT)
Age: 70
End: 2025-05-20
Payer: MEDICARE

## 2025-05-20 VITALS — HEIGHT: 62 IN | WEIGHT: 154 LBS | RESPIRATION RATE: 17 BRPM | BODY MASS INDEX: 28.34 KG/M2

## 2025-05-20 DIAGNOSIS — L03.031 PARONYCHIA OF TOENAIL OF RIGHT FOOT: ICD-10-CM

## 2025-05-20 DIAGNOSIS — B35.1 ONYCHOMYCOSIS: ICD-10-CM

## 2025-05-20 DIAGNOSIS — B35.9 DERMATOPHYTOSIS: ICD-10-CM

## 2025-05-20 DIAGNOSIS — M79.672 PAIN IN BOTH FEET: Primary | ICD-10-CM

## 2025-05-20 DIAGNOSIS — M79.671 PAIN IN BOTH FEET: Primary | ICD-10-CM

## 2025-05-20 PROCEDURE — 99213 OFFICE O/P EST LOW 20 MIN: CPT | Performed by: PODIATRIST

## 2025-05-20 RX ORDER — ESCITALOPRAM OXALATE 10 MG/1
TABLET ORAL
COMMUNITY
Start: 2025-05-19

## 2025-05-20 RX ORDER — KETOCONAZOLE 20 MG/G
CREAM TOPICAL DAILY
Qty: 60 G | Refills: 1 | Status: SHIPPED | OUTPATIENT
Start: 2025-05-20 | End: 2025-06-19

## 2025-05-20 RX ORDER — TERBINAFINE HYDROCHLORIDE 250 MG/1
250 TABLET ORAL DAILY
Qty: 30 TABLET | Refills: 0 | Status: SHIPPED | OUTPATIENT
Start: 2025-05-20 | End: 2025-06-19

## 2025-05-20 NOTE — PROGRESS NOTES
Assessment/Plan: Pain upon ambulation.  Dermatophytosis.  Paronychia right hallux.  Mycosis of nail right foot.     Plan.  Chart reviewed.  PCP notes reviewed.  Patient evaluated.  Patient has no elevation of liver function so therefore should be placed on oral terbinafine.  She will also be placed on topical cream.  She will spray shoes with Lysol.  Patient advised a nail cutting technique.  Return 2 months.           Assessment  Diagnoses and all orders for this visit:     Pain in both feet     Onychomycosis  -     terbinafine (LamISIL) 250 mg tablet; Take 1 tablet (250 mg total) by mouth daily  -     ketoconazole (NIZORAL) 2 % cream; Apply topically daily     Dermatophytosis  -     terbinafine (LamISIL) 250 mg tablet; Take 1 tablet (250 mg total) by mouth daily  -     ketoconazole (NIZORAL) 2 % cream; Apply topically daily     Paronychia of toenail of right foot              Subjective: Patient presents for second opinion.  She is concerned about her toenails.  She also has dry itchy scaly skin of her feet.  This has been ongoing.  No treatment in the past.     No Known Allergies       Current Medications      Current Outpatient Medications:     ketoconazole (NIZORAL) 2 % cream, Apply topically daily, Disp: 60 g, Rfl: 1    terbinafine (LamISIL) 250 mg tablet, Take 1 tablet (250 mg total) by mouth daily, Disp: 30 tablet, Rfl: 0    Ascorbic Acid (vitamin C) 100 MG tablet, Take 100 mg by mouth daily, Disp: , Rfl:     calcium citrate-vitamin D (CITRACAL+D) 315-200 MG-UNIT per tablet, Take 1 tablet by mouth 2 (two) times a day, Disp: , Rfl:     escitalopram (LEXAPRO) 5 mg tablet, Take 5 mg by mouth daily, Disp: , Rfl:     fluticasone (FLONASE) 50 mcg/act nasal spray, 1 spray into each nostril daily (Patient taking differently: 1 spray into each nostril as needed), Disp: , Rfl:     Garlic 10 MG CAPS, Take by mouth in the morning, Disp: , Rfl:     ibuprofen (MOTRIN) 200 mg tablet, Take by mouth every 6 (six) hours as  needed for mild pain, Disp: , Rfl:     Multiple Vitamin (MULTIVITAMIN) tablet, Take 1 tablet by mouth daily  , Disp: , Rfl:     Probiotic Product (PROBIOTIC-10 PO), Take by mouth, Disp: , Rfl:     VITAMIN D, CHOLECALCIFEROL, PO, Take by mouth, Disp: , Rfl:         Problem List       Patient Active Problem List   Diagnosis    Anxiety disorder    Exophthalmos    Mixed hyperlipidemia    Vertigo    Chronic rhinitis    Hypokalemia    Chronic headaches    TMJ (temporomandibular joint syndrome)    Depression, recurrent (HCC)               Subjective  Patient ID: Amy Pratt is a 70 y.o. female.     HPI     The following portions of the patient's history were reviewed and updated as appropriate:      family history includes Breast cancer in her maternal grandmother; Heart disease in her mother; Hyperlipidemia in her mother; Hypothyroidism in her mother; No Known Problems in her daughter, father, sister, sister, sister, sister, and son.       reports that she has never smoked. She has never been exposed to tobacco smoke. She has never used smokeless tobacco. She reports that she does not currently use alcohol. She reports that she does not use drugs.         Objective:  Patient's shoes and socks removed.    Foot Exam     General  General Appearance: appears stated age and healthy   Orientation: alert and oriented to person, place, and time   Affect: appropriate         Right Foot/Ankle      Inspection and Palpation  Swelling: none   Arch: pes planus  Skin Exam: dry skin and tinea;      Neurovascular  Dorsalis pedis: 2+  Posterior tibial: 2+        Left Foot/Ankle       Inspection and Palpation  Swelling: none   Arch: pes planus  Skin Exam: dry skin and tinea;      Neurovascular  Dorsalis pedis: 2+  Posterior tibial: 2+        Physical Exam  Vitals and nursing note reviewed.   Constitutional:       Appearance: Normal appearance.   Cardiovascular:      Rate and Rhythm: Normal rate and regular rhythm.      Pulses:            Dorsalis pedis pulses are 2+ on the right side and 2+ on the left side.        Posterior tibial pulses are 2+ on the right side and 2+ on the left side.   Feet:      Right foot:      Skin integrity: Dry skin present.      Left foot:      Skin integrity: Dry skin present.   Skin:     Comments: Patient has dystrophy of all nails.  Nails 1 3-5 of the right foot demonstrate distal mycosis with secondary lysis.  Paronychia right hallux.  Negative pus.   Neurological:      Mental Status: She is alert.   Psychiatric:         Mood and Affect: Mood normal.         Behavior: Behavior normal.         Thought Content: Thought content normal.         Judgment: Judgment normal.

## 2025-08-06 ENCOUNTER — PROCEDURE VISIT (OUTPATIENT)
Age: 70
End: 2025-08-06
Payer: MEDICARE

## 2025-08-06 VITALS — RESPIRATION RATE: 17 BRPM | HEIGHT: 62 IN | WEIGHT: 154 LBS | BODY MASS INDEX: 28.34 KG/M2

## 2025-08-06 DIAGNOSIS — M79.672 PAIN IN BOTH FEET: Primary | ICD-10-CM

## 2025-08-06 DIAGNOSIS — L03.031 PARONYCHIA OF TOENAIL OF RIGHT FOOT: ICD-10-CM

## 2025-08-06 DIAGNOSIS — M79.671 PAIN IN BOTH FEET: Primary | ICD-10-CM

## 2025-08-06 DIAGNOSIS — B35.1 ONYCHOMYCOSIS: ICD-10-CM

## 2025-08-06 DIAGNOSIS — B35.9 DERMATOPHYTOSIS: ICD-10-CM

## 2025-08-06 PROCEDURE — 99212 OFFICE O/P EST SF 10 MIN: CPT | Performed by: PODIATRIST

## 2025-08-06 RX ORDER — KETOCONAZOLE 20 MG/G
CREAM TOPICAL DAILY
Qty: 60 G | Refills: 1 | Status: SHIPPED | OUTPATIENT
Start: 2025-08-06 | End: 2025-09-05

## 2025-08-06 RX ORDER — TERBINAFINE HYDROCHLORIDE 250 MG/1
250 TABLET ORAL DAILY
Qty: 30 TABLET | Refills: 0 | Status: SHIPPED | OUTPATIENT
Start: 2025-08-06 | End: 2025-09-05